# Patient Record
Sex: FEMALE | Race: WHITE | NOT HISPANIC OR LATINO | Employment: OTHER | URBAN - METROPOLITAN AREA
[De-identification: names, ages, dates, MRNs, and addresses within clinical notes are randomized per-mention and may not be internally consistent; named-entity substitution may affect disease eponyms.]

---

## 2017-11-21 ENCOUNTER — TRANSFERRED RECORDS (OUTPATIENT)
Dept: HEALTH INFORMATION MANAGEMENT | Facility: CLINIC | Age: 62
End: 2017-11-21

## 2019-02-28 ENCOUNTER — OFFICE VISIT (OUTPATIENT)
Dept: FAMILY MEDICINE | Facility: CLINIC | Age: 64
End: 2019-02-28
Payer: COMMERCIAL

## 2019-02-28 VITALS
RESPIRATION RATE: 20 BRPM | DIASTOLIC BLOOD PRESSURE: 70 MMHG | SYSTOLIC BLOOD PRESSURE: 120 MMHG | HEART RATE: 82 BPM | BODY MASS INDEX: 36.19 KG/M2 | TEMPERATURE: 97.7 F | WEIGHT: 212 LBS | HEIGHT: 64 IN | OXYGEN SATURATION: 94 %

## 2019-02-28 DIAGNOSIS — Z12.31 ENCOUNTER FOR SCREENING MAMMOGRAM FOR BREAST CANCER: ICD-10-CM

## 2019-02-28 DIAGNOSIS — Z11.59 NEED FOR HEPATITIS C SCREENING TEST: ICD-10-CM

## 2019-02-28 DIAGNOSIS — I10 ESSENTIAL HYPERTENSION: ICD-10-CM

## 2019-02-28 DIAGNOSIS — E78.5 HYPERLIPIDEMIA, UNSPECIFIED HYPERLIPIDEMIA TYPE: ICD-10-CM

## 2019-02-28 DIAGNOSIS — L30.9 ECZEMA, UNSPECIFIED TYPE: ICD-10-CM

## 2019-02-28 DIAGNOSIS — E66.01 MORBID OBESITY (H): ICD-10-CM

## 2019-02-28 DIAGNOSIS — M81.0 OSTEOPOROSIS, UNSPECIFIED OSTEOPOROSIS TYPE, UNSPECIFIED PATHOLOGICAL FRACTURE PRESENCE: ICD-10-CM

## 2019-02-28 DIAGNOSIS — Z23 NEED FOR TD VACCINE: Primary | ICD-10-CM

## 2019-02-28 DIAGNOSIS — Z00.00 ROUTINE HISTORY AND PHYSICAL EXAMINATION OF ADULT: ICD-10-CM

## 2019-02-28 LAB
ANION GAP SERPL CALCULATED.3IONS-SCNC: 8 MMOL/L (ref 3–14)
BUN SERPL-MCNC: 16 MG/DL (ref 7–30)
CALCIUM SERPL-MCNC: 8.9 MG/DL (ref 8.5–10.1)
CHLORIDE SERPL-SCNC: 106 MMOL/L (ref 94–109)
CHOLEST SERPL-MCNC: 163 MG/DL
CO2 SERPL-SCNC: 28 MMOL/L (ref 20–32)
CREAT SERPL-MCNC: 0.69 MG/DL (ref 0.52–1.04)
GFR SERPL CREATININE-BSD FRML MDRD: >90 ML/MIN/{1.73_M2}
GLUCOSE SERPL-MCNC: 96 MG/DL (ref 70–99)
HCV AB SERPL QL IA: NONREACTIVE
HDLC SERPL-MCNC: 52 MG/DL
HGB BLD-MCNC: 15.1 G/DL (ref 11.7–15.7)
LDLC SERPL CALC-MCNC: 71 MG/DL
NONHDLC SERPL-MCNC: 111 MG/DL
POTASSIUM SERPL-SCNC: 3.8 MMOL/L (ref 3.4–5.3)
SODIUM SERPL-SCNC: 142 MMOL/L (ref 133–144)
TRIGL SERPL-MCNC: 202 MG/DL

## 2019-02-28 PROCEDURE — 85018 HEMOGLOBIN: CPT | Performed by: NURSE PRACTITIONER

## 2019-02-28 PROCEDURE — 99386 PREV VISIT NEW AGE 40-64: CPT | Performed by: NURSE PRACTITIONER

## 2019-02-28 PROCEDURE — 36415 COLL VENOUS BLD VENIPUNCTURE: CPT | Performed by: NURSE PRACTITIONER

## 2019-02-28 PROCEDURE — 86803 HEPATITIS C AB TEST: CPT | Performed by: NURSE PRACTITIONER

## 2019-02-28 PROCEDURE — 80048 BASIC METABOLIC PNL TOTAL CA: CPT | Performed by: NURSE PRACTITIONER

## 2019-02-28 PROCEDURE — 80061 LIPID PANEL: CPT | Performed by: NURSE PRACTITIONER

## 2019-02-28 RX ORDER — ALENDRONATE SODIUM 70 MG/1
70 TABLET ORAL WEEKLY
Qty: 4 TABLET | Refills: 12 | Status: SHIPPED | OUTPATIENT
Start: 2019-02-28 | End: 2020-02-25

## 2019-02-28 RX ORDER — ATORVASTATIN CALCIUM 20 MG/1
1 TABLET, FILM COATED ORAL DAILY
COMMUNITY
Start: 2019-02-07 | End: 2019-02-28

## 2019-02-28 RX ORDER — TRIAMCINOLONE ACETONIDE 1 MG/G
CREAM TOPICAL 2 TIMES DAILY
Qty: 45 G | Refills: 1 | Status: SHIPPED | OUTPATIENT
Start: 2019-02-28 | End: 2020-02-25

## 2019-02-28 RX ORDER — ATORVASTATIN CALCIUM 20 MG/1
20 TABLET, FILM COATED ORAL DAILY
Qty: 30 TABLET | Refills: 11 | Status: SHIPPED | OUTPATIENT
Start: 2019-02-28 | End: 2020-02-25

## 2019-02-28 RX ORDER — ALENDRONATE SODIUM 70 MG/1
1 TABLET ORAL WEEKLY
COMMUNITY
Start: 2019-02-07 | End: 2019-02-28

## 2019-02-28 RX ORDER — HYDROCHLOROTHIAZIDE 25 MG/1
1 TABLET ORAL DAILY
COMMUNITY
Start: 2019-02-07 | End: 2019-02-28

## 2019-02-28 RX ORDER — HYDROCHLOROTHIAZIDE 25 MG/1
25 TABLET ORAL DAILY
Qty: 30 TABLET | Refills: 11 | Status: SHIPPED | OUTPATIENT
Start: 2019-02-28 | End: 2020-02-25

## 2019-02-28 RX ORDER — PHENOL 1.4 %
1 AEROSOL, SPRAY (ML) MUCOUS MEMBRANE DAILY
COMMUNITY

## 2019-02-28 SDOH — HEALTH STABILITY: MENTAL HEALTH: HOW OFTEN DO YOU HAVE A DRINK CONTAINING ALCOHOL?: NEVER

## 2019-02-28 ASSESSMENT — ENCOUNTER SYMPTOMS
BREAST MASS: 0
JOINT SWELLING: 0
CONSTIPATION: 0
CHILLS: 0
EYE PAIN: 0
COUGH: 0
FREQUENCY: 0
HEMATURIA: 0
SHORTNESS OF BREATH: 0
NERVOUS/ANXIOUS: 0
ARTHRALGIAS: 0
ABDOMINAL PAIN: 0
SORE THROAT: 0
HEMATOCHEZIA: 0
DIARRHEA: 0
FEVER: 0
DIZZINESS: 0
NAUSEA: 0
PARESTHESIAS: 1
PALPITATIONS: 0
DYSURIA: 0
HEADACHES: 0
MYALGIAS: 0
HEARTBURN: 0
WEAKNESS: 0

## 2019-02-28 ASSESSMENT — MIFFLIN-ST. JEOR: SCORE: 1488.69

## 2019-02-28 ASSESSMENT — PAIN SCALES - GENERAL: PAINLEVEL: NO PAIN (0)

## 2019-02-28 NOTE — PATIENT INSTRUCTIONS
Schedule DEXA, mammogram in June.    Consider getting the Shingrix vaccine to prevent shingles.  Please check with your insurance to see if this is best covered at the pharmacy or at a clinic visit.  You can walk into any pharmacy and get the shot without a prescription.  You may also schedule a nurse only visit to have the shot given at the clinic.

## 2019-02-28 NOTE — LETTER
[address] [place] [address]    March 4, 2019    Masha Chery  [address] [place] [id number]          Dear Masha,    [place] Hepatitis C.   Good cholesterol.   Normal kidney function and electrolytes.   No anemia.     If you have any questions please feel free to contact [phone] or myself via [employer].     Enclosed is a copy of your results.     Results for orders placed or performed in visit on 02/28/19   Lipid panel reflex to direct LDL Fasting   Result Value Ref Range    Cholesterol 163 <200 mg/dL    Triglycerides 202 (H) <150 mg/dL    HDL Cholesterol 52 >49 mg/dL    LDL Cholesterol Calculated 71 <100 mg/dL    Non HDL Cholesterol 111 <130 mg/dL   Basic metabolic panel   Result Value Ref Range    Sodium 142 133 - 144 mmol/L    Potassium 3.8 3.4 - 5.3 mmol/L    Chloride 106 94 - 109 mmol/L    Carbon Dioxide 28 20 - 32 mmol/L    Anion Gap 8 3 - 14 mmol/L    Glucose 96 70 - 99 mg/dL    Urea Nitrogen 16 7 - 30 mg/dL    Creatinine 0.69 0.52 - 1.04 mg/dL    GFR Estimate >90 >60 mL/min/[1.73_m2]    GFR Estimate If Black >90 >60 mL/min/[1.73_m2]    Calcium 8.9 8.5 - 10.1 mg/dL   Hemoglobin   Result Value Ref Range    Hemoglobin 15.1 11.7 - 15.7 g/dL   Hepatitis C antibody   Result Value Ref Range    Hepatitis C Antibody Nonreactive NR^Nonreactive       If you have any questions or concerns, please call myself or my nurse at [phone].      Sincerely,        Claudia Salinas CNP/purdy
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
## 2019-03-01 PROCEDURE — 90471 IMMUNIZATION ADMIN: CPT | Performed by: NURSE PRACTITIONER

## 2019-03-01 PROCEDURE — 90714 TD VACC NO PRESV 7 YRS+ IM: CPT | Performed by: NURSE PRACTITIONER

## 2019-03-01 NOTE — RESULT ENCOUNTER NOTE
Dear Masha,    Your recent test results are attached.      No Hepatitis C.  Good cholesterol.  Normal kidney function and electrolytes.  No anemia.    If you have any questions please feel free to contact [phone] or myself via [employer].    Sincerely,  Claudia Salinas, CNP

## 2019-07-09 ENCOUNTER — OFFICE VISIT (OUTPATIENT)
Dept: FAMILY MEDICINE | Facility: CLINIC | Age: 64
End: 2019-07-09
Payer: COMMERCIAL

## 2019-07-09 VITALS
SYSTOLIC BLOOD PRESSURE: 115 MMHG | BODY MASS INDEX: 36.44 KG/M2 | HEART RATE: 54 BPM | WEIGHT: 209 LBS | TEMPERATURE: 98.2 F | DIASTOLIC BLOOD PRESSURE: 81 MMHG | OXYGEN SATURATION: 97 %

## 2019-07-09 DIAGNOSIS — K21.00 GASTROESOPHAGEAL REFLUX DISEASE WITH ESOPHAGITIS: ICD-10-CM

## 2019-07-09 DIAGNOSIS — J30.1 SEASONAL ALLERGIC RHINITIS DUE TO POLLEN: Primary | ICD-10-CM

## 2019-07-09 PROCEDURE — 99213 OFFICE O/P EST LOW 20 MIN: CPT | Performed by: INTERNAL MEDICINE

## 2019-07-09 RX ORDER — OMEPRAZOLE 40 MG/1
40 CAPSULE, DELAYED RELEASE ORAL DAILY
Qty: 90 CAPSULE | Refills: 3 | Status: SHIPPED | OUTPATIENT
Start: 2019-07-09 | End: 2020-02-25

## 2019-07-09 RX ORDER — CETIRIZINE HYDROCHLORIDE 10 MG/1
10 TABLET ORAL DAILY
Qty: 90 TABLET | Refills: 3 | Status: SHIPPED | OUTPATIENT
Start: 2019-07-09 | End: 2020-02-25

## 2019-07-09 ASSESSMENT — PAIN SCALES - GENERAL: PAINLEVEL: NO PAIN (0)

## 2019-07-09 NOTE — PROGRESS NOTES
Subjective     Masha Chery is a 63 year old female who presents to clinic today for the following health issues:    HPI   Cough/allergies for the past couple weeks  Upset stomach for the past 3 days  None    Coughing . Allergies ( garcía macho)  Some degree in the past.  2-3 years - was on  in the past and then removed   Broke femur at that time  Take extra calcium ( back on fosamax)  No prilosec    Hits at night.     Stomach ache at that time  gallbaldder ( benadryl and nyquil)  Up with bowel ache and       Patient Active Problem List   Diagnosis     Eczematous dermatitis     Essential hypertension     Hypercalciuria, idiopathic     Hyperlipidemia     Osteoporosis     Obesity (BMI 35.0-39.9) with comorbidity (H)     Past Surgical History:   Procedure Laterality Date     CHOLECYSTECTOMY       FEMUR SURGERY Right     Following fracture, macho and screws in place     HYSTERECTOMY, PAP NO LONGER INDICATED         Social History     Tobacco Use     Smoking status: Former Smoker     Types: Cigarettes     Start date: 1973     Last attempt to quit: 1983     Years since quittin.3     Smokeless tobacco: Never Used   Substance Use Topics     Alcohol use: No     Frequency: Never     Family History   Problem Relation Age of Onset     Diabetes Mother      Cerebrovascular Disease Father      Coronary Artery Disease Brother 51     Cerebrovascular Disease Sister          Current Outpatient Medications   Medication Sig Dispense Refill     alendronate (FOSAMAX) 70 MG tablet Take 1 tablet (70 mg) by mouth once a week 4 tablet 12     aspirin (ASA) 325 MG EC tablet Take 325 mg by mouth daily       atorvastatin (LIPITOR) 20 MG tablet Take 1 tablet (20 mg) by mouth daily 30 tablet 11     B Complex Vitamins (VITAMIN B COMPLEX PO) Take 1 tablet by mouth daily       Bioflavonoid Products (VITAMIN C PLUS) 1000 MG TABS Take 1 tablet by mouth daily       calcium carbonate-vitamin D ( CALCIUM 500/VITAMIN D3) 500-400  MG-UNIT tablet Take 1 tablet by mouth daily       cetirizine (ZYRTEC) 10 MG tablet Take 1 tablet (10 mg) by mouth daily 90 tablet 3     hydrochlorothiazide (HYDRODIURIL) 25 MG tablet Take 1 tablet (25 mg) by mouth daily 30 tablet 11     MAGNESIUM PO Take 250 mg by mouth daily       MAGNESIUM PO Take 100 mg by mouth daily       Multiple Vitamins-Minerals (MULTIVITAMIN WOMEN) TABS Take 1 tablet by mouth daily       Omega-3 Fatty Acids (FISH OIL) 600 MG CAPS Take 1 capsule by mouth daily       omeprazole (PRILOSEC) 40 MG DR capsule Take 1 capsule (40 mg) by mouth daily 90 capsule 3     triamcinolone (KENALOG) 0.1 % external cream Apply topically 2 times daily 45 g 1     Allergies   Allergen Reactions     Citric Acid Hives     Foods that contain citric acid.      Renacidin Rash     SHE'S ALLERGIC TO CITRIC ACIDS AND SOUR FOODS     Recent Labs   Lab Test 02/28/19  1030   LDL 71   HDL 52   TRIG 202*   CR 0.69   GFRESTIMATED >90   GFRESTBLACK >90   POTASSIUM 3.8          Reviewed and updated as needed this visit by Provider         Review of Systems   ROS COMP: Constitutional, HEENT, cardiovascular, pulmonary, gi and gu systems are negative, except as otherwise noted.      Objective    /81 (BP Location: Left arm, Patient Position: Chair, Cuff Size: Adult Regular)   Pulse 54   Temp 98.2  F (36.8  C) (Oral)   Wt 94.8 kg (209 lb)   SpO2 97%   Breastfeeding? No   BMI 36.44 kg/m    Body mass index is 36.44 kg/m .  Physical Exam   GENERAL: healthy, alert and no distress  EYES: Eyes grossly normal to inspection, PERRL and conjunctivae and sclerae normal  HENT: ear canals and TM's normal, nose and mouth without ulcers or lesions  NECK: no adenopathy, no asymmetry, masses, or scars and thyroid normal to palpation  RESP: lungs clear to auscultation - no rales, rhonchi or wheezes  CV: regular rate and rhythm, normal S1 S2, no S3 or S4, no murmur, click or rub, no peripheral edema and peripheral pulses strong  ABDOMEN:  "soft, nontender, no hepatosplenomegaly, no masses and bowel sounds normal  MS: no gross musculoskeletal defects noted, no edema  SKIN: no suspicious lesions or rashes  NEURO: Normal strength and tone, mentation intact and speech normal  BACK: no CVA tenderness, no paralumbar tenderness  PSYCH: mentation appears normal, affect normal/bright  LYMPH: no cervical, supraclavicular, axillary, or inguinal adenopathy    Diagnostic Test Results:  Labs reviewed in Epic  Results for orders placed or performed in visit on 02/28/19   Lipid panel reflex to direct LDL Fasting   Result Value Ref Range    Cholesterol 163 <200 mg/dL    Triglycerides 202 (H) <150 mg/dL    HDL Cholesterol 52 >49 mg/dL    LDL Cholesterol Calculated 71 <100 mg/dL    Non HDL Cholesterol 111 <130 mg/dL   Basic metabolic panel   Result Value Ref Range    Sodium 142 133 - 144 mmol/L    Potassium 3.8 3.4 - 5.3 mmol/L    Chloride 106 94 - 109 mmol/L    Carbon Dioxide 28 20 - 32 mmol/L    Anion Gap 8 3 - 14 mmol/L    Glucose 96 70 - 99 mg/dL    Urea Nitrogen 16 7 - 30 mg/dL    Creatinine 0.69 0.52 - 1.04 mg/dL    GFR Estimate >90 >60 mL/min/[1.73_m2]    GFR Estimate If Black >90 >60 mL/min/[1.73_m2]    Calcium 8.9 8.5 - 10.1 mg/dL   Hemoglobin   Result Value Ref Range    Hemoglobin 15.1 11.7 - 15.7 g/dL   Hepatitis C antibody   Result Value Ref Range    Hepatitis C Antibody Nonreactive NR^Nonreactive           Assessment & Plan       ICD-10-CM    1. Seasonal allergic rhinitis due to pollen J30.1 cetirizine (ZYRTEC) 10 MG tablet   2. Gastroesophageal reflux disease with esophagitis K21.0 omeprazole (PRILOSEC) 40 MG DR capsule      work-up if not improved in 4-6 weeks    BMI:   Estimated body mass index is 36.44 kg/m  as calculated from the following:    Height as of 2/28/19: 1.613 m (5' 3.5\").    Weight as of this encounter: 94.8 kg (209 lb).   Weight management plan: Discussed healthy diet and exercise guidelines        Work on weight loss  Regular " exercise    No follow-ups on file.    Jim Santoyo MD  [facility]

## 2020-01-21 ENCOUNTER — OFFICE VISIT (OUTPATIENT)
Dept: FAMILY MEDICINE | Facility: CLINIC | Age: 65
End: 2020-01-21
Payer: COMMERCIAL

## 2020-01-21 VITALS
WEIGHT: 222 LBS | TEMPERATURE: 97.4 F | HEIGHT: 64 IN | HEART RATE: 91 BPM | DIASTOLIC BLOOD PRESSURE: 72 MMHG | RESPIRATION RATE: 13 BRPM | SYSTOLIC BLOOD PRESSURE: 110 MMHG | BODY MASS INDEX: 37.9 KG/M2

## 2020-01-21 DIAGNOSIS — L30.1 DYSHIDROTIC ECZEMA: Primary | ICD-10-CM

## 2020-01-21 PROCEDURE — 99213 OFFICE O/P EST LOW 20 MIN: CPT | Performed by: NURSE PRACTITIONER

## 2020-01-21 RX ORDER — BETAMETHASONE DIPROPIONATE 0.05 %
OINTMENT (GRAM) TOPICAL 2 TIMES DAILY
Qty: 45 G | Refills: 0 | Status: SHIPPED | OUTPATIENT
Start: 2020-01-21 | End: 2020-02-25

## 2020-01-21 ASSESSMENT — MIFFLIN-ST. JEOR: SCORE: 1529.05

## 2020-01-21 NOTE — PATIENT INSTRUCTIONS
Patient Education     Atopic Dermatitis (Adult)  Atopic dermatitis is a dry, itchy, red rash. It s also called eczema. The rash is chronic, or ongoing. It can come and go over time. The disease is often passed down in families. It causes a problem with the skin barrier that makes the skin more sensitive to the environment and other factors. The increased skin sensitivity causes an itch, which causes scratching. Scratching can worsen the itching or also break the skin. This can put the skin at risk of infection.  The condition is most common in people with asthma, hay fever, hives, or dry or sensitive skin. The rash may be caused by extreme heat or heavy sweating. Skin irritants can cause the rash to flare up. These can include wool or silk clothing, grease, oils, some medicines, and harsh soaps and detergents. Emotional stress can also be a trigger.  Treatment is done to relieve the itching and inflammation of the skin.  Home care  Follow these tips to care for your condition:    Keep the areas of rash clean by bathing at least every other day. Use lukewarm water to bathe. Don t use hot water, which can dry out the skin.    Don t use soaps with strong detergents. Use mild soaps made for sensitive skin.    Apply a cream or ointment to damp skin right after bathing.    Avoid things that irritate your skin. Wear absorbent, soft fabrics next to the skin rather than rough or scratchy materials.    Use mild laundry soap free of scents and perfumes. Make sure to rinse all the soap out of your clothes.    Treat any skin infection as directed.    Use oral diphenhydramine to help reduce itching. This is an antihistamine you can buy at drug and grocery stores. It can make you sleepy, so use lower doses during the daytime. Or you can use loratadine. This is an antihistamine that will not make you sleepy. Do not use diphenhydramine if you have glaucoma or have trouble urinating due to an enlarged prostate.  Follow-up care  See  your healthcare provider, or as advised. If your symptoms don t get better or if they get worse in the next 7 days, make an appointment with your healthcare provider.  When to seek medical advice  Call your healthcare provider right away  if any of these occur:    Increasing area of redness or pain in the skin    Yellow crusts or wet drainage from the rash    Fever of 100.4 F (38 C) or higher, or as directed by your healthcare provider  Date Last Reviewed: 9/1/2016 2000-2019 [employer]. [address] [place] [address]. All rights reserved. This information is not intended as a substitute for professional medical care. Always follow your healthcare professional's instructions.    Topical corticosteroids are applied twice daily for two to four weeks       General measures -- In clinical experience, avoidance of irritants or exacerbating factors is beneficial for most patients with dyshidrotic eczema. General skin care measures aimed at reducing skin irritation and restoring the skin barrier include [25]:  ?Using lukewarm water and soap-free cleansers to wash hands  ?Drying hands thoroughly after washing  ?Applying emollients (eg, petroleum jelly) immediately after hand drying and as often as possible   ?Wearing cotton gloves under vinyl or other nonlatex gloves when performing wet work  ?Removing rings and watches and bracelets before wet work  ?Wearing protective gloves in cold weather   ?Wearing task-specific gloves for frictional exposures (eg, gardening, carpentry)  ?Avoiding exposure to irritants (eg, detergents, solvents, hair lotions or dyes, acidic foods [eg, citrus fruit])

## 2020-01-21 NOTE — PROGRESS NOTES
"Subjective     Masha Chery is a 64 year old female who presents to clinic today for the following health issues:    HPI   Concern - hands  Onset: 1 month    Description:   Redness and painful, dry and itchy    Intensity: severe    Progression of Symptoms:  worsening    Accompanying Signs & Symptoms:  Swollen     Previous history of similar problem:   none    Precipitating factors:   Worsened by: washing of hand    Alleviating factors:  Improved by:     Therapies Tried and outcome: all topic of lotion and cream    Past Medical History:   Diagnosis Date     Hyperlipidemia      Hypertension      Osteoporosis      Current Outpatient Medications   Medication     alendronate (FOSAMAX) 70 MG tablet     aspirin (ASA) 325 MG EC tablet     atorvastatin (LIPITOR) 20 MG tablet     B Complex Vitamins (VITAMIN B COMPLEX PO)     betamethasone dipropionate (DIPROSONE) 0.05 % external ointment     Bioflavonoid Products (VITAMIN C PLUS) 1000 MG TABS     calcium carbonate-vitamin D (SM CALCIUM 500/VITAMIN D3) 500-400 MG-UNIT tablet     cetirizine (ZYRTEC) 10 MG tablet     hydrochlorothiazide (HYDRODIURIL) 25 MG tablet     MAGNESIUM PO     MAGNESIUM PO     Multiple Vitamins-Minerals (MULTIVITAMIN WOMEN) TABS     Omega-3 Fatty Acids (FISH OIL) 600 MG CAPS     omeprazole (PRILOSEC) 40 MG DR capsule     triamcinolone (KENALOG) 0.1 % external cream     No current facility-administered medications for this visit.         Allergies   Allergen Reactions     Citric Acid Hives     Foods that contain citric acid.      Renacidin Rash     SHE'S ALLERGIC TO CITRIC ACIDS AND SOUR FOODS       Review of Systems   ROS COMP: Constitutional, HEENT, cardiovascular, pulmonary, GI, , musculoskeletal, neuro, skin, endocrine and psych systems are negative, except as otherwise noted.      Objective    /72   Pulse 91   Temp 97.4  F (36.3  C)   Resp 13   Ht 1.613 m (5' 3.5\")   Wt 100.7 kg (222 lb)   BMI 38.71 kg/m      Physical Exam "   GENERAL: Alert and no distress  EYES: Eyes grossly normal to inspection, PERRL and conjunctivae and sclerae normal  HENT: ear canals and TM's normal, nose and mouth without ulcers or lesions  NECK: no adenopathy, no asymmetry, masses, or scars and thyroid normal to palpation  RESP: lungs clear to auscultation - no rales, rhonchi or wheezes  CV: regular rate and rhythm, normal S1 S2, no S3 or S4, no murmur, click or rub, no peripheral edema and peripheral pulses strong  SKIN: pruritic, vesicular eruption affecting both hands    Diagnostic Test Results:  Labs reviewed in Epic        Assessment & Plan     1. Dyshidrotic eczema    - betamethasone dipropionate (DIPROSONE) 0.05 % external ointment; Apply topically 2 times daily for 14 days  Dispense: 45 g; Refill: 0    General measures -- In clinical experience, avoidance of irritants or exacerbating factors is beneficial for most patients with dyshidrotic eczema. General skin care measures aimed at reducing skin irritation and restoring the skin barrier include [25]:  ?Using lukewarm water and soap-free cleansers to wash hands  ?Drying hands thoroughly after washing  ?Applying emollients (eg, petroleum jelly) immediately after hand drying and as often as possible   ?Wearing cotton gloves under vinyl or other nonlatex gloves when performing wet work  ?Removing rings and watches and bracelets before wet work  ?Wearing protective gloves in cold weather   ?Wearing task-specific gloves for frictional exposures (eg, gardening, carpentry)  ?Avoiding exposure to irritants (eg, detergents, solvents, hair lotions or dyes, acidic foods [eg, citrus fruit])     Call or rtc if new worsening     PURA Mccall [facility]

## 2020-02-13 ENCOUNTER — TRANSFERRED RECORDS (OUTPATIENT)
Dept: HEALTH INFORMATION MANAGEMENT | Facility: CLINIC | Age: 65
End: 2020-02-13

## 2020-02-17 ENCOUNTER — ANCILLARY PROCEDURE (OUTPATIENT)
Dept: GENERAL RADIOLOGY | Facility: CLINIC | Age: 65
End: 2020-02-17
Attending: FAMILY MEDICINE
Payer: COMMERCIAL

## 2020-02-17 ENCOUNTER — OFFICE VISIT (OUTPATIENT)
Dept: FAMILY MEDICINE | Facility: CLINIC | Age: 65
End: 2020-02-17
Payer: COMMERCIAL

## 2020-02-17 VITALS
DIASTOLIC BLOOD PRESSURE: 80 MMHG | HEIGHT: 64 IN | HEART RATE: 65 BPM | WEIGHT: 222 LBS | TEMPERATURE: 97.8 F | SYSTOLIC BLOOD PRESSURE: 119 MMHG | OXYGEN SATURATION: 95 % | BODY MASS INDEX: 37.9 KG/M2

## 2020-02-17 DIAGNOSIS — R07.81 RIB PAIN ON RIGHT SIDE: ICD-10-CM

## 2020-02-17 DIAGNOSIS — R07.81 RIB PAIN ON RIGHT SIDE: Primary | ICD-10-CM

## 2020-02-17 PROCEDURE — 71101 X-RAY EXAM UNILAT RIBS/CHEST: CPT | Mod: RT

## 2020-02-17 PROCEDURE — 99213 OFFICE O/P EST LOW 20 MIN: CPT | Performed by: FAMILY MEDICINE

## 2020-02-17 RX ORDER — ALENDRONATE SODIUM 70 MG/1
70 TABLET ORAL WEEKLY
Qty: 4 TABLET | Refills: 12 | Status: CANCELLED | OUTPATIENT
Start: 2020-02-17

## 2020-02-17 RX ORDER — CETIRIZINE HYDROCHLORIDE 10 MG/1
10 TABLET ORAL DAILY
Qty: 90 TABLET | Refills: 3 | Status: CANCELLED | OUTPATIENT
Start: 2020-02-17

## 2020-02-17 RX ORDER — CYCLOBENZAPRINE HCL 5 MG
5 TABLET ORAL
Qty: 30 TABLET | Refills: 0 | Status: SHIPPED | OUTPATIENT
Start: 2020-02-17 | End: 2020-02-25

## 2020-02-17 ASSESSMENT — MIFFLIN-ST. JEOR: SCORE: 1536.99

## 2020-02-17 ASSESSMENT — PAIN SCALES - GENERAL: PAINLEVEL: EXTREME PAIN (8)

## 2020-02-17 NOTE — PATIENT INSTRUCTIONS
Patient Education     Rib Contusion or Minor Fracture    A rib contusion is a bruise to one or more rib bones. It may cause pain, tenderness, swelling, and a purplish color to the skin. There may be a sharp pain with each breath. A rib contusion takes anywhere from a few days to a few weeks to heal. A minor rib fracture or break may cause the same symptoms as a rib contusion. The small crack may not be seen on a regular chest X-ray. Treatment for both problems is basically the same.  Home care    You may use over-the-counter pain medicine to control pain, unless another pain medicine was prescribed. If you have chronic liver or kidney disease or ever had a stomach ulcer or GI (gastrointestinal) bleeding, talk with your healthcare provider before using these medicines.    Rest. Don't lift anything heavy or do any activity that causes pain.    Apply an ice pack over the injured area for 15 to 20 minutes every 1 to 2 hours. You should do this for the first 24 to 48 hours. To make an ice pack, put ice cubes in a plastic bag that seals at the top. Wrap the bag in a clean, thin towel or cloth. Never put ice or an ice pack directly on the skin. Continue with ice packs as needed for the relief of pain and swelling.    The first 3 to 4 weeks of healing will be the most painful. If your pain is not under control with the treatment given, call your healthcare provider. Sometimes a stronger pain medicine may be needed. A nerve block can be done in case of severe pain. It will numb the nerve between the ribs.  Follow-up care  Follow up with your healthcare provider, or as advised.  If X-rays were taken, you will be told of any new findings that may affect your care.  Call 911  Call 911 if you have:    Dizziness, weakness or fainting    Shortness of breath with or without chest discomfort    New or worsening pain  When to seek medical advice  Call your healthcare provider right away if any of these occur:    Fever of 100.4 F  (38 C) or higher, or as directed by your healthcare provider    Chills    Stomach pain, vomiting  Date Last Reviewed: 6/1/2018 2000-2019 [employer]. [address] [place] [address], [place] [address]. All rights reserved. This information is not intended as a substitute for professional medical care. Always follow your healthcare professional's instructions.

## 2020-02-17 NOTE — PROGRESS NOTES
Subjective     Masha Chery is a 64 year old female who presents to clinic today for the following health issues:    HPI :  Patient reports 2 weeks ago she was sitting in her chair when she leaned to her right side she bruised right side of her upper back rib area.  Since that time is been painful especially when she moves certain ways.  Been taking Tylenol and that seems to help.    She has no fever, no cough, no wheezing.  Denies falling.  No bruising or rashes    FLANK   PAIN     Onset: two weeks    Description:   Character: Sharp  Location: right upper quadrant  Radiation: right side of chest    Intensity: severe, 8/10    Progression of Symptoms:  worsening and constant    Accompanying Signs & Symptoms:  Fever/Chills?: no   Gas/Bloating: no   Nausea: no   Vomitting: no   Diarrhea?: no   Constipation:no   Dysuria or Hematuria: no    History:   Trauma: YES-  Stretched to far  Previous similar pain: no    Previous tests done: none    Precipitating factors:   Does the pain change with:     Food: no      BM: no     Urination: no     Alleviating factors:  None    Therapies Tried and outcome: Arthritis pills    LMP:  not applicable         Patient Active Problem List   Diagnosis     Eczematous dermatitis     Essential hypertension     Hypercalciuria, idiopathic     Hyperlipidemia     Osteoporosis     Obesity (BMI 35.0-39.9) with comorbidity (H)     Past Surgical History:   Procedure Laterality Date     CHOLECYSTECTOMY       FEMUR SURGERY Right     Following fracture, macho and screws in place     HYSTERECTOMY, PAP NO LONGER INDICATED         Social History     Tobacco Use     Smoking status: Former Smoker     Types: Cigarettes     Start date: 1973     Last attempt to quit: 1983     Years since quittin.9     Smokeless tobacco: Never Used   Substance Use Topics     Alcohol use: No     Frequency: Never     Family History   Problem Relation Age of Onset     Diabetes Mother      Cerebrovascular  Disease Father      Coronary Artery Disease Brother 51     Cerebrovascular Disease Sister          Current Outpatient Medications   Medication Sig Dispense Refill     alendronate (FOSAMAX) 70 MG tablet Take 1 tablet (70 mg) by mouth once a week 4 tablet 12     aspirin (ASA) 325 MG EC tablet Take 325 mg by mouth daily       atorvastatin (LIPITOR) 20 MG tablet Take 1 tablet (20 mg) by mouth daily 30 tablet 11     B Complex Vitamins (VITAMIN B COMPLEX PO) Take 1 tablet by mouth daily       Bioflavonoid Products (VITAMIN C PLUS) 1000 MG TABS Take 1 tablet by mouth daily       calcium carbonate-vitamin D (SM CALCIUM 500/VITAMIN D3) 500-400 MG-UNIT tablet Take 1 tablet by mouth daily       cetirizine (ZYRTEC) 10 MG tablet Take 1 tablet (10 mg) by mouth daily 90 tablet 3     cyclobenzaprine (FLEXERIL) 5 MG tablet Take 1 tablet (5 mg) by mouth nightly as needed for muscle spasms 30 tablet 0     hydrochlorothiazide (HYDRODIURIL) 25 MG tablet Take 1 tablet (25 mg) by mouth daily 30 tablet 11     MAGNESIUM PO Take 250 mg by mouth daily       Multiple Vitamins-Minerals (MULTIVITAMIN WOMEN) TABS Take 1 tablet by mouth daily       Omega-3 Fatty Acids (FISH OIL) 600 MG CAPS Take 1 capsule by mouth daily       omeprazole (PRILOSEC) 40 MG DR capsule Take 1 capsule (40 mg) by mouth daily 90 capsule 3     triamcinolone (KENALOG) 0.1 % external cream Apply topically 2 times daily 45 g 1     MAGNESIUM PO Take 100 mg by mouth daily       Allergies   Allergen Reactions     Citric Acid Hives     Foods that contain citric acid.      Renacidin Rash     SHE'S ALLERGIC TO CITRIC ACIDS AND SOUR FOODS       Reviewed and updated as needed this visit by Provider         Review of Systems   ROS COMP: Constitutional, HEENT, cardiovascular, pulmonary, gi and gu systems are negative, except as otherwise noted.      Objective    There were no vitals taken for this visit.  There is no height or weight on file to calculate BMI.  Physical Exam   GENERAL:  "healthy, alert and no distress  RESP: lungs clear to auscultation - no rales, rhonchi or wheezes  CV: regular rate and rhythm, normal S1 S2, no S3 or S4, no murmur, click or rub, no peripheral edema and peripheral pulses strong  MS: right upper/ back ribs area tender    Diagnostic Test Results:  Labs reviewed in Epic  CXR, with ribs: negative    Assessment & Plan       ICD-10-CM    1. Rib pain on right side R07.81 XR Ribs & Chest Right G/E 3 Views     cyclobenzaprine (FLEXERIL) 5 MG tablet     Negative for x-rays, advised patient with supportive care, continue applying ice to that area.  Continue with Tylenol 3 times daily as needed.  Take Flexeril at bedtime as needed.    In addition, continue taking deep breaths.  May apply topical lidocaine cream or BenGay cream.    Patient does have a follow-up appointment next week for for a full physical exam  BMI:   Estimated body mass index is 38.11 kg/m  as calculated from the following:    Height as of this encounter: 1.626 m (5' 4\").    Weight as of this encounter: 100.7 kg (222 lb).   Weight management plan: Discussed healthy diet and exercise guidelines        Patient Instructions     Patient Education     Rib Contusion or Minor Fracture    A rib contusion is a bruise to one or more rib bones. It may cause pain, tenderness, swelling, and a purplish color to the skin. There may be a sharp pain with each breath. A rib contusion takes anywhere from a few days to a few weeks to heal. A minor rib fracture or break may cause the same symptoms as a rib contusion. The small crack may not be seen on a regular chest X-ray. Treatment for both problems is basically the same.  Home care    You may use over-the-counter pain medicine to control pain, unless another pain medicine was prescribed. If you have chronic liver or kidney disease or ever had a stomach ulcer or GI (gastrointestinal) bleeding, talk with your healthcare provider before using these medicines.    Rest. Don't lift " anything heavy or do any activity that causes pain.    Apply an ice pack over the injured area for 15 to 20 minutes every 1 to 2 hours. You should do this for the first 24 to 48 hours. To make an ice pack, put ice cubes in a plastic bag that seals at the top. Wrap the bag in a clean, thin towel or cloth. Never put ice or an ice pack directly on the skin. Continue with ice packs as needed for the relief of pain and swelling.    The first 3 to 4 weeks of healing will be the most painful. If your pain is not under control with the treatment given, call your healthcare provider. Sometimes a stronger pain medicine may be needed. A nerve block can be done in case of severe pain. It will numb the nerve between the ribs.  Follow-up care  Follow up with your healthcare provider, or as advised.  If X-rays were taken, you will be told of any new findings that may affect your care.  Call 911  Call 911 if you have:    Dizziness, weakness or fainting    Shortness of breath with or without chest discomfort    New or worsening pain  When to seek medical advice  Call your healthcare provider right away if any of these occur:    Fever of 100.4 F (38 C) or higher, or as directed by your healthcare provider    Chills    Stomach pain, vomiting  Date Last Reviewed: 6/1/2018 2000-2019 [employer]. [address] [place] [address], [place] [address]. All rights reserved. This information is not intended as a substitute for professional medical care. Always follow your healthcare professional's instructions.               Return in about 1 week (around 2/24/2020) for Physical Exam.    Crissy Edwards MD  [facility]

## 2020-02-25 ENCOUNTER — OFFICE VISIT (OUTPATIENT)
Dept: FAMILY MEDICINE | Facility: CLINIC | Age: 65
End: 2020-02-25
Payer: COMMERCIAL

## 2020-02-25 ENCOUNTER — DOCUMENTATION ONLY (OUTPATIENT)
Dept: LAB | Facility: CLINIC | Age: 65
End: 2020-02-25

## 2020-02-25 VITALS
DIASTOLIC BLOOD PRESSURE: 78 MMHG | BODY MASS INDEX: 38.79 KG/M2 | SYSTOLIC BLOOD PRESSURE: 110 MMHG | OXYGEN SATURATION: 94 % | HEART RATE: 77 BPM | WEIGHT: 226 LBS

## 2020-02-25 DIAGNOSIS — R07.81 RIB PAIN ON RIGHT SIDE: ICD-10-CM

## 2020-02-25 DIAGNOSIS — Z00.00 ENCOUNTER FOR MEDICARE ANNUAL WELLNESS EXAM: Primary | ICD-10-CM

## 2020-02-25 DIAGNOSIS — E78.5 HYPERLIPIDEMIA LDL GOAL <100: ICD-10-CM

## 2020-02-25 DIAGNOSIS — I10 ESSENTIAL HYPERTENSION: ICD-10-CM

## 2020-02-25 DIAGNOSIS — M80.00XA AGE-RELATED OSTEOPOROSIS WITH CURRENT PATHOLOGICAL FRACTURE, INITIAL ENCOUNTER: ICD-10-CM

## 2020-02-25 DIAGNOSIS — J30.1 SEASONAL ALLERGIC RHINITIS DUE TO POLLEN: ICD-10-CM

## 2020-02-25 DIAGNOSIS — E78.5 HYPERLIPIDEMIA, UNSPECIFIED HYPERLIPIDEMIA TYPE: ICD-10-CM

## 2020-02-25 DIAGNOSIS — M81.0 OSTEOPOROSIS, UNSPECIFIED OSTEOPOROSIS TYPE, UNSPECIFIED PATHOLOGICAL FRACTURE PRESENCE: ICD-10-CM

## 2020-02-25 DIAGNOSIS — Z12.31 VISIT FOR SCREENING MAMMOGRAM: ICD-10-CM

## 2020-02-25 DIAGNOSIS — K21.00 GASTROESOPHAGEAL REFLUX DISEASE WITH ESOPHAGITIS: ICD-10-CM

## 2020-02-25 DIAGNOSIS — L30.9 ECZEMA, UNSPECIFIED TYPE: ICD-10-CM

## 2020-02-25 PROCEDURE — G0009 ADMIN PNEUMOCOCCAL VACCINE: HCPCS | Performed by: INTERNAL MEDICINE

## 2020-02-25 PROCEDURE — 90732 PPSV23 VACC 2 YRS+ SUBQ/IM: CPT | Performed by: INTERNAL MEDICINE

## 2020-02-25 PROCEDURE — 99397 PER PM REEVAL EST PAT 65+ YR: CPT | Mod: 25 | Performed by: INTERNAL MEDICINE

## 2020-02-25 RX ORDER — OMEPRAZOLE 40 MG/1
40 CAPSULE, DELAYED RELEASE ORAL DAILY
Qty: 90 CAPSULE | Refills: 3 | Status: SHIPPED | OUTPATIENT
Start: 2020-02-25 | End: 2021-01-26

## 2020-02-25 RX ORDER — ATORVASTATIN CALCIUM 20 MG/1
20 TABLET, FILM COATED ORAL DAILY
Qty: 90 TABLET | Refills: 3 | Status: SHIPPED | OUTPATIENT
Start: 2020-02-25 | End: 2020-12-26

## 2020-02-25 RX ORDER — TRIAMCINOLONE ACETONIDE 1 MG/G
CREAM TOPICAL 2 TIMES DAILY
Qty: 45 G | Refills: 1 | Status: SHIPPED | OUTPATIENT
Start: 2020-02-25 | End: 2020-08-24

## 2020-02-25 RX ORDER — HYDROCHLOROTHIAZIDE 25 MG/1
25 TABLET ORAL DAILY
Qty: 90 TABLET | Refills: 3 | Status: SHIPPED | OUTPATIENT
Start: 2020-02-25 | End: 2020-12-26

## 2020-02-25 RX ORDER — ALENDRONATE SODIUM 70 MG/1
70 TABLET ORAL WEEKLY
Qty: 12 TABLET | Refills: 3 | Status: SHIPPED | OUTPATIENT
Start: 2020-02-25 | End: 2020-12-20

## 2020-02-25 RX ORDER — CYCLOBENZAPRINE HCL 5 MG
5 TABLET ORAL
Qty: 30 TABLET | Refills: 11 | Status: SHIPPED | OUTPATIENT
Start: 2020-02-25 | End: 2023-02-13

## 2020-02-25 RX ORDER — CETIRIZINE HYDROCHLORIDE 10 MG/1
10 TABLET ORAL DAILY
Qty: 90 TABLET | Refills: 3 | Status: SHIPPED | OUTPATIENT
Start: 2020-02-25 | End: 2020-07-09

## 2020-02-25 ASSESSMENT — ENCOUNTER SYMPTOMS
WEAKNESS: 0
MYALGIAS: 1
DIZZINESS: 0
HEADACHES: 0
DYSURIA: 0
HEMATURIA: 0
PARESTHESIAS: 1
ARTHRALGIAS: 0
BREAST MASS: 0
HEMATOCHEZIA: 0
FREQUENCY: 0
SHORTNESS OF BREATH: 0
COUGH: 0
HEARTBURN: 0
CONSTIPATION: 0
NERVOUS/ANXIOUS: 0
DIARRHEA: 0
ABDOMINAL PAIN: 0
PALPITATIONS: 0
EYE PAIN: 0
FEVER: 0
NAUSEA: 0
SORE THROAT: 0
CHILLS: 0
JOINT SWELLING: 0

## 2020-02-25 ASSESSMENT — ACTIVITIES OF DAILY LIVING (ADL): CURRENT_FUNCTION: NO ASSISTANCE NEEDED

## 2020-02-25 NOTE — PROGRESS NOTES
"SUBJECTIVE:   Masha Chery is a 64 year old female who presents for Preventive Visit.  Are you in the first 12 months of your Medicare coverage?  Yes  [facility] advantage plan  Refill scripts.      Healthy Habits:     In general, how would you rate your overall health?  Good    Frequency of exercise:  None    Do you usually eat at least 4 servings of fruit and vegetables a day, include whole grains    & fiber and avoid regularly eating high fat or \"junk\" foods?  Yes    Taking medications regularly:  Yes    Barriers to taking medications:  None    Medication side effects:  None    Ability to successfully perform activities of daily living:  No assistance needed    Home Safety:  No safety concerns identified    Hearing Impairment:  No hearing concerns    In the past 6 months, have you been bothered by leaking of urine?  No    In general, how would you rate your overall mental or emotional health?  Good      PHQ-2 Total Score: 0    Additional concerns today:  No  initially off fosamax and on the calcium  Initially moved to [facility].  nd now  to [place]  Back on the fosamax.  DEXA   Was on for a few years, then off and then on 5 years.  prolia shot in the past.  2015 , but has had previous osteoporosis     Mammogram every year   Children   Do you feel safe in your environment? NOT APPLICABLE    Have you ever done Advance Care Planning? (For example, a Health Directive, POLST, or a discussion with a medical provider or your loved ones about your wishes): No, advance care planning information given to patient to review.  Patient plans to discuss their wishes with loved ones or provider.      Fall risk  Fallen 2 or more times in the past year?: No  Any fall with injury in the past year?: No    Cognitive Screening   1) Repeat 3 items (Leader, Season, Table)    2) Clock draw: NORMAL  3) 3 item recall: Recalls 3 objects  Results: 3 items recalled: COGNITIVE IMPAIRMENT LESS LIKELY    Mini-CogTM " Copyright LAURI Esquivel. Licensed by the author for use in [facility]; reprinted with permission (blair@[facility]). All rights reserved.      Do you have sleep apnea, excessive snoring or daytime drowsiness?: no    Reviewed and updated as needed this visit by clinical staff  Tobacco  Allergies  Meds  Med Hx  Surg Hx  Fam Hx  Soc Hx        Reviewed and updated as needed this visit by Provider        Social History     Tobacco Use     Smoking status: Former Smoker     Types: Cigarettes     Start date: 1973     Last attempt to quit: 1983     Years since quittin.0     Smokeless tobacco: Never Used   Substance Use Topics     Alcohol use: No     Frequency: Never     If you drink alcohol do you typically have >3 drinks per day or >7 drinks per week? No    Alcohol Use 2020   Prescreen: >3 drinks/day or >7 drinks/week? Not Applicable   Prescreen: >3 drinks/day or >7 drinks/week? -   No flowsheet data found.        Current providers sharing in care for this patient include:   Patient Care Team:  No Ref-Primary, Physician as PCP - Claudia Ross APRN CNP as Assigned PCP    The following health maintenance items are reviewed in Epic and correct as of today:  Health Maintenance   Topic Date Due     DEXA  1955     HIV SCREENING  1969     MAMMO SCREENING  2019     FALL RISK ASSESSMENT  2019     MEDICARE ANNUAL WELLNESS VISIT  2020     PNEUMOCOCCAL IMMUNIZATION 65+ LOW/MEDIUM RISK (2 of 2 - PCV13) 2021     COLONOSCOPY  2025     ADVANCE CARE PLANNING  2025     DTAP/TDAP/TD IMMUNIZATION (4 - Td) 2029     HEPATITIS C SCREENING  Completed     PHQ-2  Completed     INFLUENZA VACCINE  Completed     ZOSTER IMMUNIZATION  Completed     IPV IMMUNIZATION  Aged Out     MENINGITIS IMMUNIZATION  Aged Out     Lab work is in process  Labs reviewed in EPIC  BP Readings from Last 3 Encounters:   20 110/78   20 119/80   20 110/72     Wt Readings from Last 3 Encounters:   20 102.5 kg (226 lb)   20 100.7 kg (222 lb)   20 100.7 kg (222 lb)                  Patient Active Problem List   Diagnosis     Eczematous dermatitis     Essential hypertension     Hypercalciuria, idiopathic     Hyperlipidemia     Osteoporosis     Obesity (BMI 35.0-39.9) with comorbidity (H)     Past Surgical History:   Procedure Laterality Date     CHOLECYSTECTOMY       FEMUR SURGERY Right     Following fracture, macho and screws in place     HYSTERECTOMY, PAP NO LONGER INDICATED         Social History     Tobacco Use     Smoking status: Former Smoker     Types: Cigarettes     Start date: 1973     Last attempt to quit: 1983     Years since quittin.0     Smokeless tobacco: Never Used   Substance Use Topics     Alcohol use: No     Frequency: Never     Family History   Problem Relation Age of Onset     Diabetes Mother      Cerebrovascular Disease Father      Coronary Artery Disease Brother 51     Cerebrovascular Disease Sister          Current Outpatient Medications   Medication Sig Dispense Refill     alendronate (FOSAMAX) 70 MG tablet Take 1 tablet (70 mg) by mouth once a week 12 tablet 3     aspirin (ASA) 325 MG EC tablet Take 325 mg by mouth daily       atorvastatin (LIPITOR) 20 MG tablet Take 1 tablet (20 mg) by mouth daily 90 tablet 3     B Complex Vitamins (VITAMIN B COMPLEX PO) Take 1 tablet by mouth daily       Bioflavonoid Products (VITAMIN C PLUS) 1000 MG TABS Take 1 tablet by mouth daily       calcium carbonate-vitamin D (SM CALCIUM 500/VITAMIN D3) 500-400 MG-UNIT tablet Take 1 tablet by mouth daily       cetirizine (ZYRTEC) 10 MG tablet Take 1 tablet (10 mg) by mouth daily 90 tablet 3     cyclobenzaprine (FLEXERIL) 5 MG tablet Take 1 tablet (5 mg) by mouth nightly as needed for muscle spasms 30 tablet 11     hydrochlorothiazide (HYDRODIURIL) 25 MG tablet Take 1 tablet (25 mg) by mouth daily 90 tablet 3     MAGNESIUM PO Take 250 mg  "by mouth daily       Multiple Vitamins-Minerals (MULTIVITAMIN WOMEN) TABS Take 1 tablet by mouth daily       Omega-3 Fatty Acids (FISH OIL) 600 MG CAPS Take 1 capsule by mouth daily       omeprazole (PRILOSEC) 40 MG DR capsule Take 1 capsule (40 mg) by mouth daily 90 capsule 3     triamcinolone (KENALOG) 0.1 % external cream Apply topically 2 times daily 45 g 1     Allergies   Allergen Reactions     Citric Acid Hives     Foods that contain citric acid.      Renacidin Rash     SHE'S ALLERGIC TO CITRIC ACIDS AND SOUR FOODS         Review of Systems  Constitutional, HEENT, cardiovascular, pulmonary, gi and gu systems are negative, except as otherwise noted.    OBJECTIVE:   /78 (BP Location: Right arm, Patient Position: Chair, Cuff Size: Adult Regular)   Pulse 77   Wt 102.5 kg (226 lb)   SpO2 94%   BMI 38.79 kg/m   Estimated body mass index is 38.79 kg/m  as calculated from the following:    Height as of 2/17/20: 1.626 m (5' 4\").    Weight as of this encounter: 102.5 kg (226 lb).  Physical Exam  GENERAL: healthy, alert and no distress  EYES: Eyes grossly normal to inspection, PERRL and conjunctivae and sclerae normal  HENT: ear canals and TM's normal, nose and mouth without ulcers or lesions  NECK: no adenopathy, no asymmetry, masses, or scars and thyroid normal to palpation  RESP: lungs clear to auscultation - no rales, rhonchi or wheezes  CV: regular rate and rhythm, normal S1 S2, no S3 or S4, no murmur, click or rub, no peripheral edema and peripheral pulses strong  ABDOMEN: soft, nontender, no hepatosplenomegaly, no masses and bowel sounds normal  MS: no gross musculoskeletal defects noted, no edema  SKIN: no suspicious lesions or rashes  NEURO: Normal strength and tone, mentation intact and speech normal  BACK: no CVA tenderness, no paralumbar tenderness  PSYCH: mentation appears normal, affect normal/bright  LYMPH: no cervical, supraclavicular, axillary, or inguinal adenopathy    Diagnostic Test " "Results:  Labs reviewed in Epic  No results found for any visits on 02/25/20.    ASSESSMENT / PLAN:       ICD-10-CM    1. Encounter for Medicare annual wellness exam Z00.00    2. Osteoporosis, unspecified osteoporosis type, unspecified pathological fracture presence M81.0 alendronate (FOSAMAX) 70 MG tablet     **Vitamin D Deficiency FUTURE 2mo     **Parathyroid Hormone Intact FUTURE 2mo     Phosphorus     Magnesium     DX Hip/Pelvis/Spine   3. Hyperlipidemia, unspecified hyperlipidemia type E78.5 atorvastatin (LIPITOR) 20 MG tablet   4. Seasonal allergic rhinitis due to pollen J30.1 cetirizine (ZYRTEC) 10 MG tablet   5. Rib pain on right side R07.81 cyclobenzaprine (FLEXERIL) 5 MG tablet   6. Essential hypertension I10 hydrochlorothiazide (HYDRODIURIL) 25 MG tablet     **Basic metabolic panel FUTURE anytime   7. Gastroesophageal reflux disease with esophagitis K21.0 omeprazole (PRILOSEC) 40 MG DR capsule   8. Eczema, unspecified type L30.9 triamcinolone (KENALOG) 0.1 % external cream   9. Age-related osteoporosis with current pathological fracture, initial encounter M80.00XA ENDOCRINOLOGY ADULT REFERRAL   10. Hyperlipidemia LDL goal <100 E78.5 Lipid panel reflex to direct LDL Fasting   11. Visit for screening mammogram Z12.31 *MA Screening Digital Bilateral       COUNSELING:  Reviewed preventive health counseling, as reflected in patient instructions       Regular exercise       Healthy diet/nutrition       Vision screening       Hearing screening       Dental care       Bladder control       Fall risk prevention    Estimated body mass index is 38.79 kg/m  as calculated from the following:    Height as of 2/17/20: 1.626 m (5' 4\").    Weight as of this encounter: 102.5 kg (226 lb).    Weight management plan: Discussed healthy diet and exercise guidelines     reports that she quit smoking about 37 years ago. Her smoking use included cigarettes. She started smoking about 47 years ago. She has never used smokeless " tobacco.      Appropriate preventive services were discussed with this patient, including applicable screening as appropriate for cardiovascular disease, diabetes, osteopenia/osteoporosis, and glaucoma.  As appropriate for age/gender, discussed screening for colorectal cancer, prostate cancer, breast cancer, and cervical cancer. Checklist reviewing preventive services available has been given to the patient.    Reviewed patients plan of care and provided an AVS. The Basic Care Plan (routine screening as documented in Health Maintenance) for Masha meets the Care Plan requirement. This Care Plan has been established and reviewed with the Patient.    Counseling Resources:  ATP IV Guidelines  Pooled Cohorts Equation Calculator  Breast Cancer Risk Calculator  FRAX Risk Assessment  ICSI Preventive Guidelines  Dietary Guidelines for Americans, 2010  Prosetta's MyPlate  ASA Prophylaxis  Lung CA Screening    Jim Santoyo MD  [facility]    Identified Health Risks:    She is at risk for lack of exercise and has been provided with information to increase physical activity for the benefit of her well-being.  She is at risk for lack of exercise and has been provided with information to increase physical activity for the benefit of her well-being.

## 2020-02-25 NOTE — PATIENT INSTRUCTIONS
Patient Education   Personalized Prevention Plan  You are due for the preventive services outlined below.  Your care team is available to assist you in scheduling these services.  If you have already completed any of these items, please share that information with your care team to update in your medical record.  Health Maintenance Due   Topic Date Due     Osteoporosis Screening  1955     HIV Screening  12/24/1969     Mammogram  11/21/2019     FALL RISK ASSESSMENT  12/24/2019     PHQ-2  01/01/2020     Pneumococcal Vaccine (1 of 2 - PCV13) 12/24/2019     Annual Wellness Visit  02/28/2020       Exercise for a Healthier Heart     Exercise with a friend. When activity is fun, you're more likely to stick with it.   You may wonder how you can improve the health of your heart. If you re thinking about exercise, you re on the right track. You don t need to become an athlete, but you do need a certain amount of brisk exercise to help strengthen your heart. If you have been diagnosed with a heart condition, your doctor may recommend exercise to help stabilize your condition. To help make exercise a habit, choose safe, fun activities.  Be sure to check with your healthcare provider before starting an exercise program.  Why exercise?  Exercising regularly offers many healthy rewards. It can help you do all of the following:    Improve your blood cholesterol level to help prevent further heart trouble    Lower your blood pressure to help prevent a stroke or heart attack    Control diabetes, or reduce your risk of getting this disease    Improve your heart and lung function    Reach and maintain a healthy weight    Make your muscles stronger and more limber so you can stay active    Prevent falls and fractures by slowing the loss of bone mass (osteoporosis)    Manage stress better    Reduce your blood pressure    Improve your sense of self and your body image  Exercise tips  Ease into your routine. Set small goals. Then  build on them.  Exercise on most days. Aim for a total of 150 or more minutes of moderate to  vigorous intensity activity each week. Consider 40 minutes, 3 to 4 times a week. For best results, activity should last for 40 minutes on average. It is OK to work up to the 40 minute period over time. Examples of moderate-intensity activity is walking 1 mile in 15 minutes or 30 to 45 minutes of yard work.  Step up your daily activity level. Along with your exercise program, try being more active throughout the day. Walk instead of drive. Do more household tasks or yard work.  Choose one or more activities you enjoy. Walking is one of the easiest things you can do. You can also try swimming, riding a bike, dancing, or taking an exercise class.  Stop exercising and call your doctor if you:    Have chest pain or feel dizzy or lightheaded    Feel burning, tightness, pressure, or heaviness in your chest, neck, shoulders, back, or arms    Have unusual shortness of breath    Have increased joint or muscle pain    Have palpitations or an irregular heartbeat  Date Last Reviewed: 5/1/2016 2000-2019 [employer]. [address] [place] [address]. All rights reserved. This information is not intended as a substitute for professional medical care. Always follow your healthcare professional's instructions.           Patient Education   Personalized Prevention Plan  You are due for the preventive services outlined below.  Your care team is available to assist you in scheduling these services.  If you have already completed any of these items, please share that information with your care team to update in your medical record.  Health Maintenance Due   Topic Date Due     Osteoporosis Screening  1955     HIV Screening  12/24/1969     Mammogram  11/21/2019     FALL RISK ASSESSMENT  12/24/2019     PHQ-2  01/01/2020     Pneumococcal Vaccine (1 of 2 - PCV13) 12/24/2019     Annual Wellness Visit  02/28/2020        Exercise for a Healthier Heart     Exercise with a friend. When activity is fun, you're more likely to stick with it.   You may wonder how you can improve the health of your heart. If you re thinking about exercise, you re on the right track. You don t need to become an athlete, but you do need a certain amount of brisk exercise to help strengthen your heart. If you have been diagnosed with a heart condition, your doctor may recommend exercise to help stabilize your condition. To help make exercise a habit, choose safe, fun activities.  Be sure to check with your healthcare provider before starting an exercise program.  Why exercise?  Exercising regularly offers many healthy rewards. It can help you do all of the following:    Improve your blood cholesterol level to help prevent further heart trouble    Lower your blood pressure to help prevent a stroke or heart attack    Control diabetes, or reduce your risk of getting this disease    Improve your heart and lung function    Reach and maintain a healthy weight    Make your muscles stronger and more limber so you can stay active    Prevent falls and fractures by slowing the loss of bone mass (osteoporosis)    Manage stress better    Reduce your blood pressure    Improve your sense of self and your body image  Exercise tips  Ease into your routine. Set small goals. Then build on them.  Exercise on most days. Aim for a total of 150 or more minutes of moderate to  vigorous intensity activity each week. Consider 40 minutes, 3 to 4 times a week. For best results, activity should last for 40 minutes on average. It is OK to work up to the 40 minute period over time. Examples of moderate-intensity activity is walking 1 mile in 15 minutes or 30 to 45 minutes of yard work.  Step up your daily activity level. Along with your exercise program, try being more active throughout the day. Walk instead of drive. Do more household tasks or yard work.  Choose one or more  activities you enjoy. Walking is one of the easiest things you can do. You can also try swimming, riding a bike, dancing, or taking an exercise class.  Stop exercising and call your doctor if you:    Have chest pain or feel dizzy or lightheaded    Feel burning, tightness, pressure, or heaviness in your chest, neck, shoulders, back, or arms    Have unusual shortness of breath    Have increased joint or muscle pain    Have palpitations or an irregular heartbeat  Date Last Reviewed: 5/1/2016 2000-2019 [employer]. [address] [place] [id number]. All rights reserved. This information is not intended as a substitute for professional medical care. Always follow your healthcare professional's instructions.

## 2020-02-26 DIAGNOSIS — I10 ESSENTIAL HYPERTENSION: ICD-10-CM

## 2020-02-26 DIAGNOSIS — M81.0 OSTEOPOROSIS, UNSPECIFIED OSTEOPOROSIS TYPE, UNSPECIFIED PATHOLOGICAL FRACTURE PRESENCE: ICD-10-CM

## 2020-02-26 DIAGNOSIS — E78.5 HYPERLIPIDEMIA LDL GOAL <100: ICD-10-CM

## 2020-02-26 LAB
ANION GAP SERPL CALCULATED.3IONS-SCNC: 9 MMOL/L (ref 3–14)
BUN SERPL-MCNC: 20 MG/DL (ref 7–30)
CALCIUM SERPL-MCNC: 9.2 MG/DL (ref 8.5–10.1)
CHLORIDE SERPL-SCNC: 102 MMOL/L (ref 94–109)
CHOLEST SERPL-MCNC: 170 MG/DL
CO2 SERPL-SCNC: 28 MMOL/L (ref 20–32)
CREAT SERPL-MCNC: 0.7 MG/DL (ref 0.52–1.04)
GFR SERPL CREATININE-BSD FRML MDRD: >90 ML/MIN/{1.73_M2}
GLUCOSE SERPL-MCNC: 109 MG/DL (ref 70–99)
HDLC SERPL-MCNC: 45 MG/DL
LDLC SERPL CALC-MCNC: 88 MG/DL
MAGNESIUM SERPL-MCNC: 2.1 MG/DL (ref 1.6–2.3)
NONHDLC SERPL-MCNC: 125 MG/DL
PHOSPHATE SERPL-MCNC: 3.1 MG/DL (ref 2.5–4.5)
POTASSIUM SERPL-SCNC: 3.3 MMOL/L (ref 3.4–5.3)
PTH-INTACT SERPL-MCNC: 56 PG/ML (ref 18–80)
SODIUM SERPL-SCNC: 139 MMOL/L (ref 133–144)
TRIGL SERPL-MCNC: 184 MG/DL

## 2020-02-26 PROCEDURE — 83970 ASSAY OF PARATHORMONE: CPT | Performed by: INTERNAL MEDICINE

## 2020-02-26 PROCEDURE — 80061 LIPID PANEL: CPT | Performed by: INTERNAL MEDICINE

## 2020-02-26 PROCEDURE — 84100 ASSAY OF PHOSPHORUS: CPT | Performed by: INTERNAL MEDICINE

## 2020-02-26 PROCEDURE — 36415 COLL VENOUS BLD VENIPUNCTURE: CPT | Performed by: INTERNAL MEDICINE

## 2020-02-26 PROCEDURE — 80048 BASIC METABOLIC PNL TOTAL CA: CPT | Performed by: INTERNAL MEDICINE

## 2020-02-26 PROCEDURE — 83735 ASSAY OF MAGNESIUM: CPT | Performed by: INTERNAL MEDICINE

## 2020-02-26 PROCEDURE — 82306 VITAMIN D 25 HYDROXY: CPT | Performed by: INTERNAL MEDICINE

## 2020-02-27 LAB — DEPRECATED CALCIDIOL+CALCIFEROL SERPL-MC: 65 UG/L (ref 20–75)

## 2020-03-02 ENCOUNTER — ANCILLARY PROCEDURE (OUTPATIENT)
Dept: BONE DENSITY | Facility: CLINIC | Age: 65
End: 2020-03-02
Attending: INTERNAL MEDICINE
Payer: COMMERCIAL

## 2020-03-02 DIAGNOSIS — M81.0 OSTEOPOROSIS, UNSPECIFIED OSTEOPOROSIS TYPE, UNSPECIFIED PATHOLOGICAL FRACTURE PRESENCE: ICD-10-CM

## 2020-03-02 DIAGNOSIS — M81.0 OSTEOPOROSIS: ICD-10-CM

## 2020-03-02 PROCEDURE — 77081 DXA BONE DENSITY APPENDICULR: CPT | Performed by: INTERNAL MEDICINE

## 2020-03-02 PROCEDURE — 77080 DXA BONE DENSITY AXIAL: CPT | Mod: XU | Performed by: INTERNAL MEDICINE

## 2020-07-09 DIAGNOSIS — J30.1 SEASONAL ALLERGIC RHINITIS DUE TO POLLEN: ICD-10-CM

## 2020-07-09 RX ORDER — CETIRIZINE HYDROCHLORIDE 10 MG/1
TABLET, FILM COATED ORAL
Qty: 90 TABLET | Refills: 0 | Status: SHIPPED | OUTPATIENT
Start: 2020-07-09

## 2020-07-09 NOTE — TELEPHONE ENCOUNTER
Routing refill request to provider for review/approval because:  Failed due to patient's age

## 2020-12-17 DIAGNOSIS — M81.0 OSTEOPOROSIS, UNSPECIFIED OSTEOPOROSIS TYPE, UNSPECIFIED PATHOLOGICAL FRACTURE PRESENCE: ICD-10-CM

## 2020-12-20 RX ORDER — ALENDRONATE SODIUM 70 MG/1
TABLET ORAL
Qty: 12 TABLET | Refills: 0 | Status: SHIPPED | OUTPATIENT
Start: 2020-12-20 | End: 2021-01-26

## 2020-12-23 DIAGNOSIS — I10 ESSENTIAL HYPERTENSION: ICD-10-CM

## 2020-12-23 DIAGNOSIS — E78.5 HYPERLIPIDEMIA, UNSPECIFIED HYPERLIPIDEMIA TYPE: ICD-10-CM

## 2020-12-26 RX ORDER — HYDROCHLOROTHIAZIDE 25 MG/1
25 TABLET ORAL DAILY
Qty: 90 TABLET | Refills: 0 | Status: SHIPPED | OUTPATIENT
Start: 2020-12-26 | End: 2021-01-26

## 2020-12-26 RX ORDER — ATORVASTATIN CALCIUM 20 MG/1
TABLET, FILM COATED ORAL
Qty: 90 TABLET | Refills: 0 | Status: SHIPPED | OUTPATIENT
Start: 2020-12-26 | End: 2021-01-26

## 2021-01-26 ENCOUNTER — OFFICE VISIT (OUTPATIENT)
Dept: FAMILY MEDICINE | Facility: CLINIC | Age: 66
End: 2021-01-26
Payer: COMMERCIAL

## 2021-01-26 VITALS
SYSTOLIC BLOOD PRESSURE: 136 MMHG | HEART RATE: 86 BPM | HEIGHT: 64 IN | BODY MASS INDEX: 36.54 KG/M2 | OXYGEN SATURATION: 97 % | DIASTOLIC BLOOD PRESSURE: 88 MMHG | TEMPERATURE: 99 F | WEIGHT: 214 LBS

## 2021-01-26 DIAGNOSIS — E78.5 HYPERLIPIDEMIA, UNSPECIFIED HYPERLIPIDEMIA TYPE: ICD-10-CM

## 2021-01-26 DIAGNOSIS — Z00.00 ENCOUNTER FOR MEDICARE ANNUAL WELLNESS EXAM: Primary | ICD-10-CM

## 2021-01-26 DIAGNOSIS — E66.01 MORBID OBESITY (H): ICD-10-CM

## 2021-01-26 DIAGNOSIS — M81.0 OSTEOPOROSIS, UNSPECIFIED OSTEOPOROSIS TYPE, UNSPECIFIED PATHOLOGICAL FRACTURE PRESENCE: ICD-10-CM

## 2021-01-26 DIAGNOSIS — Z12.31 VISIT FOR SCREENING MAMMOGRAM: ICD-10-CM

## 2021-01-26 DIAGNOSIS — K21.00 GASTROESOPHAGEAL REFLUX DISEASE WITH ESOPHAGITIS WITHOUT HEMORRHAGE: ICD-10-CM

## 2021-01-26 DIAGNOSIS — I10 ESSENTIAL HYPERTENSION: ICD-10-CM

## 2021-01-26 PROCEDURE — 99397 PER PM REEVAL EST PAT 65+ YR: CPT | Performed by: NURSE PRACTITIONER

## 2021-01-26 RX ORDER — ATORVASTATIN CALCIUM 20 MG/1
20 TABLET, FILM COATED ORAL DAILY
Qty: 90 TABLET | Refills: 3 | Status: SHIPPED | OUTPATIENT
Start: 2021-01-26 | End: 2022-02-14

## 2021-01-26 RX ORDER — ALENDRONATE SODIUM 70 MG/1
TABLET ORAL
Qty: 12 TABLET | Refills: 3 | Status: SHIPPED | OUTPATIENT
Start: 2021-01-26 | End: 2022-01-12

## 2021-01-26 RX ORDER — OMEPRAZOLE 40 MG/1
40 CAPSULE, DELAYED RELEASE ORAL DAILY
Qty: 90 CAPSULE | Refills: 3 | Status: CANCELLED | OUTPATIENT
Start: 2021-01-26

## 2021-01-26 RX ORDER — HYDROCHLOROTHIAZIDE 25 MG/1
25 TABLET ORAL DAILY
Qty: 90 TABLET | Refills: 3 | Status: SHIPPED | OUTPATIENT
Start: 2021-01-26 | End: 2022-02-14

## 2021-01-26 ASSESSMENT — ENCOUNTER SYMPTOMS
HEMATOCHEZIA: 0
WEAKNESS: 0
JOINT SWELLING: 0
COUGH: 0
EYE PAIN: 0
HEARTBURN: 0
BREAST MASS: 0
PARESTHESIAS: 0
CHILLS: 0
SORE THROAT: 0
ABDOMINAL PAIN: 0
DYSURIA: 0
DIARRHEA: 0
HEMATURIA: 0
FREQUENCY: 0
SHORTNESS OF BREATH: 0
DIZZINESS: 0
ARTHRALGIAS: 0
FEVER: 0
MYALGIAS: 0
CONSTIPATION: 0
HEADACHES: 0
PALPITATIONS: 0
NAUSEA: 0
NERVOUS/ANXIOUS: 0

## 2021-01-26 ASSESSMENT — MIFFLIN-ST. JEOR: SCORE: 1488.45

## 2021-01-26 ASSESSMENT — ACTIVITIES OF DAILY LIVING (ADL): CURRENT_FUNCTION: NO ASSISTANCE NEEDED

## 2021-01-26 ASSESSMENT — PAIN SCALES - GENERAL: PAINLEVEL: NO PAIN (0)

## 2021-01-26 NOTE — PATIENT INSTRUCTIONS
We are working hard to begin vaccinating more people against COVID-19. Currently, we are only vaccinating Phase 1a workers - healthcare workers who are unable to do their job remotely. Vaccine availability is very limited.      If you are a healthcare worker and you are unable to do your job remotely, please log in to [employer] using this link to see if we have openings and schedule an appointment. At your vaccine appointment, you will be asked to provide proof of employment as a health care worker. If you cannot, you will be turned away.     Vaccine appointments are being added as they become available. Please check your [employer] account frequently for availability.  If you have technical difficulty using [employer], call [phone] for assistance.     You can learn more about the state's phased approach to administering the vaccine, with details on each phase, here.      Phase 1b is the next group that will get vaccinated and includes frontline essential workers and adults 75 years of age and older. When we are able to start vaccinating this group, we will share that information on our website. Check this website to stay up to date on COVID-19 vaccination information.    Patient Education   Personalized Prevention Plan  You are due for the preventive services outlined below.  Your care team is available to assist you in scheduling these services.  If you have already completed any of these items, please share that information with your care team to update in your medical record.  Health Maintenance Due   Topic Date Due     Mammogram  11/21/2019     PHQ-2  01/01/2021     Annual Wellness Visit  02/25/2021     FALL RISK ASSESSMENT  02/25/2021

## 2021-01-26 NOTE — PROGRESS NOTES
SUBJECTIVE:   Masha Chery is a 65 year old female who presents for Preventive Visit.      Patient has been advised of split billing requirements and indicates understanding: Yes   Are you in the first 12 months of your Medicare coverage?  No    HPI  Do you feel safe in your environment? Yes    Have you ever done Advance Care Planning? (For example, a Health Directive, POLST, or a discussion with a medical provider or your loved ones about your wishes): Yes, patient states has an Advance Care Planning document and will bring a copy to the clinic.       Fall risk  Fallen 2 or more times in the past year?: No  Any fall with injury in the past year?: No    Cognitive Screening   1) Repeat 3 items (Leader, Season, Table)    2) Clock draw: NORMAL  3) 3 item recall: Recalls 3 objects  Results: 3 items recalled: COGNITIVE IMPAIRMENT LESS LIKELY    Mini-CogTM Copyright S [employer]. Licensed by the author for use in [facility]; reprinted with permission (blair@[facility]). All rights reserved.      Do you have sleep apnea, excessive snoring or daytime drowsiness?: no    Reviewed and updated as needed this visit by clinical staff  Tobacco  Allergies  Meds              Reviewed and updated as needed this visit by Provider                Social History     Tobacco Use     Smoking status: Former Smoker     Types: Cigarettes     Start date: 1973     Quit date: 1983     Years since quittin.9     Smokeless tobacco: Never Used   Substance Use Topics     Alcohol use: No     Frequency: Never         Alcohol Use 2020   Prescreen: >3 drinks/day or >7 drinks/week? Not Applicable   Prescreen: >3 drinks/day or >7 drinks/week? -         Hyperlipidemia Follow-Up      Are you regularly taking any medication or supplement to lower your cholesterol?   Yes- atorvastatin    Are you having muscle aches or other side effects that you think could be caused by your cholesterol lowering medication?   "No    Hypertension Follow-up      Do you check your blood pressure regularly outside of the clinic? No     Are you following a low salt diet? Yes    Are your blood pressures ever more than 140 on the top number (systolic) OR more   than 90 on the bottom number (diastolic), for example 140/90? No      Current providers sharing in care for this patient include:Patient Care Team:  No Ref-Primary, Physician as PCP - General  Jim Santoyo MD as Assigned PCP    The following health maintenance items are reviewed in Epic and correct as of today:  Health Maintenance   Topic Date Due     MAMMO SCREENING  11/21/2019     PHQ-2  01/01/2021     MEDICARE ANNUAL WELLNESS VISIT  02/25/2021     FALL RISK ASSESSMENT  02/25/2021     COLORECTAL CANCER SCREENING  01/19/2025     LIPID  02/26/2025     ADVANCE CARE PLANNING  02/27/2025     DTAP/TDAP/TD IMMUNIZATION (4 - Td) 09/16/2029     DEXA  03/02/2035     HEPATITIS C SCREENING  Completed     INFLUENZA VACCINE  Completed     Pneumococcal Vaccine: 65+ Years  Completed     ZOSTER IMMUNIZATION  Completed     Pneumococcal Vaccine: Pediatrics (0 to 5 Years) and At-Risk Patients (6 to 64 Years)  Aged Out     IPV IMMUNIZATION  Aged Out     MENINGITIS IMMUNIZATION  Aged Out     HEPATITIS B IMMUNIZATION  Aged Out       Mammogram Screening: Recommended mammography every 1-2 years with patient discussion and risk factor consideration    Review of Systems  Constitutional, HEENT, cardiovascular, pulmonary, GI, , musculoskeletal, neuro, skin, endocrine and psych systems are negative, except as otherwise noted.    OBJECTIVE:   /88   Pulse 86   Temp 99  F (37.2  C) (Oral)   Ht 1.614 m (5' 3.54\")   Wt 97.1 kg (214 lb)   SpO2 97%   BMI 37.26 kg/m   Estimated body mass index is 37.26 kg/m  as calculated from the following:    Height as of this encounter: 1.614 m (5' 3.54\").    Weight as of this encounter: 97.1 kg (214 lb).  Physical Exam  GENERAL: healthy, alert and no distress  EYES: " Eyes grossly normal to inspection, PERRL and conjunctivae and sclerae normal  HENT: ear canals and TM's normal, nose and mouth without ulcers or lesions  NECK: no adenopathy, no asymmetry, masses, or scars, thyroid normal to palpation and no carotid bruits  RESP: lungs clear to auscultation - no rales, rhonchi or wheezes  BREAST: normal without masses, tenderness or nipple discharge and no palpable axillary masses or adenopathy  CV: regular rate and rhythm, normal S1 S2, no S3 or S4, no murmur, click or rub, no peripheral edema and peripheral pulses strong  ABDOMEN: soft, nontender, no hepatosplenomegaly, no masses and bowel sounds normal  MS: no gross musculoskeletal defects noted, no edema  NEURO: Normal strength and tone, mentation intact and speech normal  PSYCH: mentation appears normal, affect normal/bright    Diagnostic Test Results:  none     ASSESSMENT / PLAN:   1. Encounter for Medicare annual wellness exam      2. Morbid obesity (H)  Patient to work on exercise.    3. Essential hypertension  Stable.  Continue current treatment plan and medications.   - hydrochlorothiazide (HYDRODIURIL) 25 MG tablet; Take 1 tablet (25 mg) by mouth daily  Dispense: 90 tablet; Refill: 3  - **Basic metabolic panel FUTURE anytime; Future  - **Hemoglobin FUTURE anytime; Future    4. Gastroesophageal reflux disease with esophagitis without hemorrhage  Patient stopped taking omeprazole.  Continue to monitor at this time.    5. Hyperlipidemia, unspecified hyperlipidemia type  .Stable.  Continue current treatment plan and medications.   - atorvastatin (LIPITOR) 20 MG tablet; Take 1 tablet (20 mg) by mouth daily  Dispense: 90 tablet; Refill: 3  - Lipid panel reflex to direct LDL Fasting; Future    6. Osteoporosis, unspecified osteoporosis type, unspecified pathological fracture presence  Stable.  Continue current treatment plan and medications.   - alendronate (FOSAMAX) 70 MG tablet; TAKE 1 TABLET BY MOUTH  EVERY WEEK  Dispense: 12  "tablet; Refill: 3    7. Visit for screening mammogram    - *MA Screening Digital Bilateral; Future    Patient has been advised of split billing requirements and indicates understanding: Yes  COUNSELING:  Reviewed preventive health counseling, as reflected in patient instructions       Regular exercise       Healthy diet/nutrition       Osteoporosis prevention/bone health    Estimated body mass index is 37.26 kg/m  as calculated from the following:    Height as of this encounter: 1.614 m (5' 3.54\").    Weight as of this encounter: 97.1 kg (214 lb).    Weight management plan: Discussed healthy diet and exercise guidelines    She reports that she quit smoking about 37 years ago. Her smoking use included cigarettes. She started smoking about 47 years ago. She has never used smokeless tobacco.      Appropriate preventive services were discussed with this patient, including applicable screening as appropriate for cardiovascular disease, diabetes, osteopenia/osteoporosis, and glaucoma.  As appropriate for age/gender, discussed screening for colorectal cancer, prostate cancer, breast cancer, and cervical cancer. Checklist reviewing preventive services available has been given to the patient.    Reviewed patients plan of care and provided an AVS. The Basic Care Plan (routine screening as documented in Health Maintenance) for Masha meets the Care Plan requirement. This Care Plan has been established and reviewed with the Patient.    Counseling Resources:  ATP IV Guidelines  Pooled Cohorts Equation Calculator  Breast Cancer Risk Calculator  Breast Cancer: Medication to Reduce Risk  FRAX Risk Assessment  ICSI Preventive Guidelines  Dietary Guidelines for Americans, 2010  USDA's MyPlate  ASA Prophylaxis  Lung CA Screening    PURA Cochran CNP  [facility]    Identified Health Risks:  "

## 2021-01-29 ENCOUNTER — ANCILLARY PROCEDURE (OUTPATIENT)
Dept: MAMMOGRAPHY | Facility: CLINIC | Age: 66
End: 2021-01-29
Attending: NURSE PRACTITIONER
Payer: COMMERCIAL

## 2021-01-29 DIAGNOSIS — Z12.31 VISIT FOR SCREENING MAMMOGRAM: ICD-10-CM

## 2021-01-29 PROCEDURE — 77067 SCR MAMMO BI INCL CAD: CPT | Mod: TC | Performed by: RADIOLOGY

## 2021-02-11 DIAGNOSIS — I10 ESSENTIAL HYPERTENSION: ICD-10-CM

## 2021-02-11 DIAGNOSIS — E78.5 HYPERLIPIDEMIA, UNSPECIFIED HYPERLIPIDEMIA TYPE: ICD-10-CM

## 2021-02-11 LAB
ANION GAP SERPL CALCULATED.3IONS-SCNC: 5 MMOL/L (ref 3–14)
BUN SERPL-MCNC: 18 MG/DL (ref 7–30)
CALCIUM SERPL-MCNC: 8.9 MG/DL (ref 8.5–10.1)
CHLORIDE SERPL-SCNC: 107 MMOL/L (ref 94–109)
CHOLEST SERPL-MCNC: 165 MG/DL
CO2 SERPL-SCNC: 29 MMOL/L (ref 20–32)
CREAT SERPL-MCNC: 0.81 MG/DL (ref 0.52–1.04)
GFR SERPL CREATININE-BSD FRML MDRD: 75 ML/MIN/{1.73_M2}
GLUCOSE SERPL-MCNC: 108 MG/DL (ref 70–99)
HDLC SERPL-MCNC: 52 MG/DL
HGB BLD-MCNC: 13.6 G/DL (ref 11.7–15.7)
LDLC SERPL CALC-MCNC: 89 MG/DL
NONHDLC SERPL-MCNC: 113 MG/DL
POTASSIUM SERPL-SCNC: 3.6 MMOL/L (ref 3.4–5.3)
SODIUM SERPL-SCNC: 141 MMOL/L (ref 133–144)
TRIGL SERPL-MCNC: 123 MG/DL

## 2021-02-11 PROCEDURE — 36415 COLL VENOUS BLD VENIPUNCTURE: CPT | Performed by: NURSE PRACTITIONER

## 2021-02-11 PROCEDURE — 80061 LIPID PANEL: CPT | Performed by: NURSE PRACTITIONER

## 2021-02-11 PROCEDURE — 80048 BASIC METABOLIC PNL TOTAL CA: CPT | Performed by: NURSE PRACTITIONER

## 2021-02-11 PROCEDURE — 85018 HEMOGLOBIN: CPT | Performed by: NURSE PRACTITIONER

## 2021-02-11 NOTE — RESULT ENCOUNTER NOTE
Dear Masha,    Your recent test results are attached.      No anemia.    If you have any questions please feel free to contact [phone] or myself via [employer].    Sincerely,  Claudia Salinas, CNP

## 2021-02-11 NOTE — LETTER
February 12, 2021      Masha Chery  [address] [place] [id number]        Dear ,    We are writing to inform you of your test results.    No anemia.     If you have any questions please feel free to contact [phone] or myself via [email or web address].       Resulted Orders   **Hemoglobin FUTURE anytime   Result Value Ref Range    Hemoglobin 13.6 11.7 - 15.7 g/dL       If you have any questions or concerns, please call the clinic at the number listed above.       Sincerely,      PURA Echavarria CNP/purdy

## 2021-02-15 PROBLEM — R73.01 IMPAIRED FASTING GLUCOSE: Status: ACTIVE | Noted: 2021-02-15

## 2021-03-03 ENCOUNTER — TELEPHONE (OUTPATIENT)
Dept: INTERNAL MEDICINE | Facility: CLINIC | Age: 66
End: 2021-03-03

## 2021-03-03 NOTE — TELEPHONE ENCOUNTER
Spoke with pt. States she had 12 teeth pulled yesterday. Decided on her own to not take Alendronate last Friday. Knows that this medication is for osteoporosis and helps with moving bone structures around.Should she start medication again on Friday, or should she continue to be off of this medication for another 1-2 weeks? Dentist and surgeon told her to contact PCP.  Please advise.    Rut Fernando RN  [facility]

## 2021-03-03 NOTE — TELEPHONE ENCOUNTER
Let's have patient hold alendronate for the next month to ensure everything heals well.    Claudia Salinas, CNP

## 2021-03-03 NOTE — TELEPHONE ENCOUNTER
Patient notified of Provider's message as written.  Patient verbalized understanding.    Francie Hendrix RN  [facility]

## 2021-03-14 DIAGNOSIS — K21.00 GASTROESOPHAGEAL REFLUX DISEASE WITH ESOPHAGITIS: ICD-10-CM

## 2021-03-17 NOTE — CONFIDENTIAL NOTE
"Pending Prescriptions:                       Disp   Refills    omeprazole (PRILOSEC) 40 MG DR capsule [P*90 cap*3            Sig: TAKE 1 CAPSULE BY MOUTH  DAILY    Routing refill request to provider for review/approval because:  Drug not active on patient's medication list  Pt has dx of osteoporosis    Cande Daniels RN    Requested Prescriptions   Pending Prescriptions Disp Refills     omeprazole (PRILOSEC) 40 MG DR capsule [Pharmacy Med Name: OMEPRAZOLE  40MG  CAP] 90 capsule 3     Sig: TAKE 1 CAPSULE BY MOUTH  DAILY       PPI Protocol Failed - 3/14/2021  9:33 PM        Failed - No diagnosis of osteoporosis on record        Failed - Medication is active on med list        Passed - Not on Clopidogrel (unless Pantoprazole ordered)        Passed - Recent (12 mo) or future (30 days) visit within the authorizing provider's specialty     Patient has had an office visit with the authorizing provider or a provider within the authorizing providers department within the previous 12 mos or has a future within next 30 days. See \"Patient Info\" tab in inbasket, or \"Choose Columns\" in Meds & Orders section of the refill encounter.              Passed - Patient is age 18 or older        Passed - No active pregnacy on record        Passed - No positive pregnancy test in past 12 months                   "

## 2021-03-18 RX ORDER — OMEPRAZOLE 40 MG/1
CAPSULE, DELAYED RELEASE ORAL
Qty: 90 CAPSULE | Refills: 3 | OUTPATIENT
Start: 2021-03-18

## 2021-03-18 NOTE — TELEPHONE ENCOUNTER
Spoke to patient and it was an auto request from pharmacy.  Rut BOOTHE CMA ([facility])

## 2021-03-30 DIAGNOSIS — K21.00 GASTROESOPHAGEAL REFLUX DISEASE WITH ESOPHAGITIS, UNSPECIFIED WHETHER HEMORRHAGE: ICD-10-CM

## 2021-04-02 RX ORDER — OMEPRAZOLE 40 MG/1
CAPSULE, DELAYED RELEASE ORAL
Qty: 90 CAPSULE | Refills: 3 | Status: SHIPPED | OUTPATIENT
Start: 2021-04-02 | End: 2022-05-02

## 2021-04-02 NOTE — TELEPHONE ENCOUNTER
Please advise if this needs to be added back onto med list- as PRN?    Francie Hendrix RN  [facility]

## 2021-04-16 ENCOUNTER — NURSE TRIAGE (OUTPATIENT)
Dept: FAMILY MEDICINE | Facility: CLINIC | Age: 66
End: 2021-04-16

## 2021-04-16 NOTE — TELEPHONE ENCOUNTER
Patient's spouse called with patient present.  Patient reports having increased fatigue, generalized weakness, nausea with no emesis and temperature of 101.1 degrees at this time.  Patient received the 2nd dose of the Pfizer vaccine yesterday.  Patient denies any other symptoms or concerns at this time.    Patient was advised to continue managing the symptoms at home and seek medical assistance if the symptoms persist or worsen.    Patient verbalized understanding and has no further questions or concerns at this time.      Reason for Disposition    COVID-19 vaccine, systemic reactions (e.g., fatigue, fever, muscle aches), questions about    COVID-19 vaccine, Frequently Asked Questions (FAQs)    Additional Information    Negative: [1] Difficulty breathing or swallowing AND [2] starts within 2 hours after injection    Negative: Sounds like a life-threatening emergency to the triager    Negative: [1] COVID-19 exposure AND [2] no symptoms    Negative: [1] Typical COVID-19 symptoms AND [2] symptoms that are NOT expected from vaccine (e.g., cough, difficulty breathing, loss of taste or smell, runny nose, sore throat)    Negative: [1] Typical COVID-19 symptoms AND [2] started > 3 days after getting vaccine    Negative: Fever > 104 F (40 C)    Negative: Sounds like a severe, unusual reaction to the triager    Negative: [1] Redness or red streak around the injection site AND [2] started > 48 hours after getting vaccine AND [3] fever    Negative: [1] Fever > 101 F (38.3 C) AND [2] age > 60 AND [3] started > 48 hours after getting vaccine    Negative: [1] Fever > 100.0 F (37.8 C) AND [2] bedridden (e.g., nursing home patient, CVA, chronic illness, recovering from surgery) AND [3] started > 48 hours after getting vaccine    Negative: [1] Fever > 100.0 F (37.8 C) AND [2] diabetes mellitus or weak immune system (e.g., HIV positive, cancer chemo, splenectomy, organ transplant, chronic steroids) AND [3] started > 48 hours after  "getting vaccine    Negative: [1] Redness or red streak around the injection site AND [2] started > 48 hours after getting vaccine AND [3] no fever  (Exception: red area < 1 inch or 2.5 cm wide)    Negative: [1] Pain, tenderness, or swelling at the injection site AND [2] over 3 days (72 hours) since vaccine AND [3] getting worse    Negative: Fever > 100.0 F (37.8 C) present > 3 days (72 hours)    Negative: [1] Fever > 100.0 F (37.8 C) AND [2] healthcare worker    Negative: [1] Pain, tenderness, or swelling at the injection site AND [2] lasts > 7 days    Negative: COVID-19 vaccine, injection site reaction (e.g., pain, redness, swelling), question about    Negative: [1] Requesting COVID-19 vaccine AND [2] healthcare worker (e.g., EMS first responders, doctors, nurses)    Negative: [1] Requesting COVID-19 vaccine AND [2] resident of a long-term care facility (e.g., nursing home)    Negative: [1] Requesting COVID-19 vaccine AND [2] vaccine available in the community for this patient group    Answer Assessment - Initial Assessment Questions  1. MAIN CONCERN OR SYMPTOM:  \"What is your main concern right now?\" \"What question do you have?\" \"What's the main symptom you're worried about?\" (e.g., fever, pain, redness, swelling)     Increased fatigue, nausea with no emesis, fever 101.1 degrees  2. VACCINE: \"What vaccination did you receive?\" \"Is this your first or second shot?\" (e.g., none; Moderna, Pfizer, other)      2nd dose of Pfizer  3. SYMPTOM ONSET: \"When did the fatigue, nausea, fever begin?\" (e.g., not relevant; hours, days)       now  4. SYMPTOM SEVERITY: \"How bad is it?\"       Fatigue, nausea, fever  5. FEVER: \"Is there a fever?\" If so, ask: \"What is it, how was it measured, and when did it start?\"       now  6. PAST REACTIONS: \"Have you reacted to immunizations before?\" If so, ask: \"What happened?\"      No  7. OTHER SYMPTOMS: \"Do you have any other symptoms?\"      No    Protocols used: CORONAVIRUS (COVID-19) VACCINE " QUESTIONS AND [id number] 1.3

## 2021-05-31 ENCOUNTER — RECORDS - HEALTHEAST (OUTPATIENT)
Dept: ADMINISTRATIVE | Facility: CLINIC | Age: 66
End: 2021-05-31

## 2021-06-01 ENCOUNTER — RECORDS - HEALTHEAST (OUTPATIENT)
Dept: ADMINISTRATIVE | Facility: CLINIC | Age: 66
End: 2021-06-01

## 2022-01-04 ENCOUNTER — OFFICE VISIT (OUTPATIENT)
Dept: FAMILY MEDICINE | Facility: CLINIC | Age: 67
End: 2022-01-04
Payer: COMMERCIAL

## 2022-01-04 VITALS
HEART RATE: 80 BPM | OXYGEN SATURATION: 97 % | TEMPERATURE: 98.4 F | BODY MASS INDEX: 34.29 KG/M2 | SYSTOLIC BLOOD PRESSURE: 134 MMHG | HEIGHT: 63 IN | WEIGHT: 193.5 LBS | DIASTOLIC BLOOD PRESSURE: 80 MMHG

## 2022-01-04 DIAGNOSIS — B07.0 PLANTAR WARTS: Primary | ICD-10-CM

## 2022-01-04 PROCEDURE — 17110 DESTRUCTION B9 LES UP TO 14: CPT | Performed by: PHYSICIAN ASSISTANT

## 2022-01-04 ASSESSMENT — MIFFLIN-ST. JEOR: SCORE: 1386.09

## 2022-01-04 NOTE — PROGRESS NOTES
"  Assessment & Plan     Plantar warts  May use home treatments starting tomorrow. Repeat in 2 weeks if needed.   - DESTRUCT BENIGN LESION, UP TO 14             BMI:   Estimated body mass index is 33.99 kg/m  as calculated from the following:    Height as of this encounter: 1.607 m (5' 3.27\").    Weight as of this encounter: 87.8 kg (193 lb 8 oz).           Return in about 2 weeks (around 1/18/2022) for Physical Exam.    Maritza Llanos PA-C  [facility] Flanagan is a 66 year old who presents for the following health issues  accompanied by her self.    HPI     Warts  Onset/Duration: 3-4 weeks  Description (location/number): 1 wart on right foot 2nd toe  Accompanying signs and symptoms (pain, redness): YES- pain and pressure  History: prior warts: YES- at age 6, did have wart on fingers  Therapies tried and outcome: OTC meds      Review of Systems   Constitutional, HEENT, cardiovascular, pulmonary, gi and gu systems are negative, except as otherwise noted.      Objective    /80   Pulse 80   Temp 98.4  F (36.9  C) (Oral)   Ht 1.607 m (5' 3.27\")   Wt 87.8 kg (193 lb 8 oz)   SpO2 97%   BMI 33.99 kg/m    Body mass index is 33.99 kg/m .  Physical Exam   Right 2nd digit with a wart on the lateral aspect of the toe. Treated with cryotherapy.                 "

## 2022-01-10 ENCOUNTER — TRANSFERRED RECORDS (OUTPATIENT)
Dept: HEALTH INFORMATION MANAGEMENT | Facility: CLINIC | Age: 67
End: 2022-01-10
Payer: COMMERCIAL

## 2022-01-11 DIAGNOSIS — M81.0 OSTEOPOROSIS, UNSPECIFIED OSTEOPOROSIS TYPE, UNSPECIFIED PATHOLOGICAL FRACTURE PRESENCE: ICD-10-CM

## 2022-01-12 RX ORDER — ALENDRONATE SODIUM 70 MG/1
TABLET ORAL
Qty: 12 TABLET | Refills: 3 | Status: SHIPPED | OUTPATIENT
Start: 2022-01-12 | End: 2022-06-01

## 2022-01-18 ENCOUNTER — OFFICE VISIT (OUTPATIENT)
Dept: FAMILY MEDICINE | Facility: CLINIC | Age: 67
End: 2022-01-18
Payer: COMMERCIAL

## 2022-01-18 VITALS
HEART RATE: 64 BPM | WEIGHT: 197 LBS | DIASTOLIC BLOOD PRESSURE: 85 MMHG | OXYGEN SATURATION: 98 % | SYSTOLIC BLOOD PRESSURE: 126 MMHG | BODY MASS INDEX: 34.6 KG/M2

## 2022-01-18 DIAGNOSIS — R82.994 HYPERCALCIURIA, IDIOPATHIC: ICD-10-CM

## 2022-01-18 DIAGNOSIS — M80.00XD OSTEOPOROSIS WITH CURRENT PATHOLOGICAL FRACTURE WITH ROUTINE HEALING, UNSPECIFIED OSTEOPOROSIS TYPE, SUBSEQUENT ENCOUNTER: Primary | ICD-10-CM

## 2022-01-18 DIAGNOSIS — Z78.0 ASYMPTOMATIC MENOPAUSAL STATE: ICD-10-CM

## 2022-01-18 DIAGNOSIS — E78.5 HYPERLIPIDEMIA LDL GOAL <100: ICD-10-CM

## 2022-01-18 DIAGNOSIS — I10 ESSENTIAL HYPERTENSION: ICD-10-CM

## 2022-01-18 DIAGNOSIS — E55.9 VITAMIN D DEFICIENCY: ICD-10-CM

## 2022-01-18 LAB
ALBUMIN SERPL-MCNC: 3.8 G/DL (ref 3.4–5)
ALP SERPL-CCNC: 69 U/L (ref 40–150)
ALT SERPL W P-5'-P-CCNC: 36 U/L (ref 0–50)
ANION GAP SERPL CALCULATED.3IONS-SCNC: 5 MMOL/L (ref 3–14)
AST SERPL W P-5'-P-CCNC: 24 U/L (ref 0–45)
BILIRUB SERPL-MCNC: 0.5 MG/DL (ref 0.2–1.3)
BUN SERPL-MCNC: 15 MG/DL (ref 7–30)
CALCIUM SERPL-MCNC: 9.3 MG/DL (ref 8.5–10.1)
CHLORIDE BLD-SCNC: 104 MMOL/L (ref 94–109)
CHOLEST SERPL-MCNC: 172 MG/DL
CO2 SERPL-SCNC: 31 MMOL/L (ref 20–32)
CREAT SERPL-MCNC: 0.75 MG/DL (ref 0.52–1.04)
DEPRECATED CALCIDIOL+CALCIFEROL SERPL-MC: 73 UG/L (ref 20–75)
FASTING STATUS PATIENT QL REPORTED: ABNORMAL
GFR SERPL CREATININE-BSD FRML MDRD: 87 ML/MIN/1.73M2
GLUCOSE BLD-MCNC: 113 MG/DL (ref 70–99)
HDLC SERPL-MCNC: 51 MG/DL
HGB BLD-MCNC: 14.6 G/DL (ref 11.7–15.7)
LDLC SERPL CALC-MCNC: 90 MG/DL
NONHDLC SERPL-MCNC: 121 MG/DL
POTASSIUM BLD-SCNC: 3.4 MMOL/L (ref 3.4–5.3)
PROT SERPL-MCNC: 7.4 G/DL (ref 6.8–8.8)
SODIUM SERPL-SCNC: 140 MMOL/L (ref 133–144)
TRIGL SERPL-MCNC: 154 MG/DL

## 2022-01-18 PROCEDURE — 80061 LIPID PANEL: CPT | Performed by: INTERNAL MEDICINE

## 2022-01-18 PROCEDURE — 82306 VITAMIN D 25 HYDROXY: CPT | Performed by: INTERNAL MEDICINE

## 2022-01-18 PROCEDURE — 36415 COLL VENOUS BLD VENIPUNCTURE: CPT | Performed by: INTERNAL MEDICINE

## 2022-01-18 PROCEDURE — 85018 HEMOGLOBIN: CPT | Performed by: INTERNAL MEDICINE

## 2022-01-18 PROCEDURE — 80053 COMPREHEN METABOLIC PANEL: CPT | Performed by: INTERNAL MEDICINE

## 2022-01-18 PROCEDURE — G0438 PPPS, INITIAL VISIT: HCPCS | Performed by: INTERNAL MEDICINE

## 2022-01-18 ASSESSMENT — ENCOUNTER SYMPTOMS
ABDOMINAL PAIN: 0
SHORTNESS OF BREATH: 0
DIZZINESS: 0
SORE THROAT: 0
NERVOUS/ANXIOUS: 0
PALPITATIONS: 0
DYSURIA: 0
HEARTBURN: 0
WEAKNESS: 0
MYALGIAS: 0
HEMATURIA: 0
NAUSEA: 0
FEVER: 0
CHILLS: 0
PARESTHESIAS: 0
HEMATOCHEZIA: 0
COUGH: 0
EYE PAIN: 0
ARTHRALGIAS: 0
BREAST MASS: 0
FREQUENCY: 0
HEADACHES: 0
CONSTIPATION: 0
JOINT SWELLING: 0
DIARRHEA: 0

## 2022-01-18 ASSESSMENT — ACTIVITIES OF DAILY LIVING (ADL): CURRENT_FUNCTION: NO ASSISTANCE NEEDED

## 2022-01-18 NOTE — PROGRESS NOTES
"SUBJECTIVE:   Masha Chery is a 66 year old female who presents for Preventive Visit.    Patient has been advised of split billing requirements and indicates understanding: Yes   Are you in the first 12 months of your Medicare coverage?  No    Healthy Habits:     In general, how would you rate your overall health?  Good    Frequency of exercise:  1 day/week    Duration of exercise:  15-30 minutes    Do you usually eat at least 4 servings of fruit and vegetables a day, include whole grains    & fiber and avoid regularly eating high fat or \"junk\" foods?  Yes    Taking medications regularly:  Yes    Medication side effects:  None    Ability to successfully perform activities of daily living:  No assistance needed    Home Safety:  No safety concerns identified    Hearing Impairment:  No hearing concerns    In the past 6 months, have you been bothered by leaking of urine?  No    In general, how would you rate your overall mental or emotional health?  Good      PHQ-2 Total Score: 0    Additional concerns today:  No  1/10/2022    House call b a 1 c 5.5  2016 broke femur 2010 off the fosamax.  Taking calcium and vitamin d  2015 had prolia then stopped at that time   [id number] .      Wart treatent on the tow     Do you feel safe in your environment? Yes    Have you ever done Advance Care Planning? (For example, a Health Directive, POLST, or a discussion with a medical provider or your loved ones about your wishes): No, advance care planning information given to patient to review.  Patient plans to discuss their wishes with loved ones or provider.       plate in the mouth and   Off the alendronate  Off and   Fall risk  Fallen 2 or more times in the past year?: No  Any fall with injury in the past year?: No    Cognitive Screening   1) Repeat 3 items (Leader, Season, Table)    2) Clock draw: NORMAL  3) 3 item recall: Recalls 3 objects  Results: 3 items recalled: COGNITIVE IMPAIRMENT LESS LIKELY    Mini-CogTM Copyright " LAURI Esquivel. Licensed by the author for use in [facility]; reprinted with permission (blair@[facility]). All rights reserved.      Do you have sleep apnea, excessive snoring or daytime drowsiness?: no    Reviewed and updated as needed this visit by clinical staff  Tobacco  Allergies  Meds             Reviewed and updated as needed this visit by Provider               Social History     Tobacco Use     Smoking status: Former Smoker     Types: Cigarettes     Start date: 1973     Quit date: 1983     Years since quittin.9     Smokeless tobacco: Never Used   Substance Use Topics     Alcohol use: No     If you drink alcohol do you typically have >3 drinks per day or >7 drinks per week? Not applicable    Alcohol Use 2022   Prescreen: >3 drinks/day or >7 drinks/week? Not Applicable   Prescreen: >3 drinks/day or >7 drinks/week? -   No flowsheet data found.        Current providers sharing in care for this patient include:   Patient Care Team:  No Ref-Primary, Physician as PCP - Claudia Ross, PURA MORRIS as Assigned PCP    The following health maintenance items are reviewed in Epic and correct as of today:  There are no preventive care reminders to display for this patient.  Lab work is in process  Labs reviewed in EPIC  BP Readings from Last 3 Encounters:   22 126/85   22 134/80   21 136/88    Wt Readings from Last 3 Encounters:   22 89.4 kg (197 lb)   22 87.8 kg (193 lb 8 oz)   21 97.1 kg (214 lb)                  Patient Active Problem List   Diagnosis     Eczematous dermatitis     Essential hypertension     Hypercalciuria, idiopathic     Hyperlipidemia     Osteoporosis     Morbid obesity (H)     Impaired fasting glucose     Past Surgical History:   Procedure Laterality Date     CHOLECYSTECTOMY       FEMUR SURGERY Right     Following fracture, macho and screws in place     HC REMOVAL GALLBLADDER      Description: Cholecystectomy;   Recorded: 2012;     HYSTERECTOMY, PAP NO LONGER INDICATED         Social History     Tobacco Use     Smoking status: Former Smoker     Types: Cigarettes     Start date: 1973     Quit date: 1983     Years since quittin.9     Smokeless tobacco: Never Used   Substance Use Topics     Alcohol use: No     Family History   Problem Relation Age of Onset     Diabetes Mother      Cerebrovascular Disease Father      Coronary Artery Disease Brother 51     Cerebrovascular Disease Sister          Current Outpatient Medications   Medication Sig Dispense Refill     alendronate (FOSAMAX) 70 MG tablet TAKE 1 TABLET BY MOUTH  WEEKLY 12 tablet 3     aspirin (ASA) 325 MG EC tablet Take 325 mg by mouth daily       atorvastatin (LIPITOR) 20 MG tablet Take 1 tablet (20 mg) by mouth daily 90 tablet 3     B Complex Vitamins (VITAMIN B COMPLEX PO) Take 1 tablet by mouth daily       Bioflavonoid Products (VITAMIN C PLUS) 1000 MG TABS Take 1 tablet by mouth daily       calcium carbonate-vitamin D ( CALCIUM 500/VITAMIN D3) 500-400 MG-UNIT tablet Take 1 tablet by mouth daily       cyclobenzaprine (FLEXERIL) 5 MG tablet Take 1 tablet (5 mg) by mouth nightly as needed for muscle spasms (Patient taking differently: Take 40 mg by mouth nightly as needed for muscle spasms ) 30 tablet 11     hydrochlorothiazide (HYDRODIURIL) 25 MG tablet Take 1 tablet (25 mg) by mouth daily 90 tablet 3     MAGNESIUM PO Take 250 mg by mouth daily       Multiple Vitamins-Minerals (MULTIVITAMIN WOMEN) TABS Take 1 tablet by mouth daily       Omega-3 Fatty Acids (FISH OIL) 600 MG CAPS Take 1 capsule by mouth daily       omeprazole (PRILOSEC) 40 MG DR capsule TAKE 1 CAPSULE BY MOUTH  DAILY 90 capsule 3     triamcinolone (KENALOG) 0.1 % external cream APPLY TOPICALLY TWO TIMES  DAILY 45 g 1     EQ ALLERGY RELIEF, CETIRIZINE, 10 MG tablet Take 1 tablet by mouth once daily (Patient not taking: Reported on 2022) 90 tablet 0     Allergies   Allergen  "Reactions     Citric Acid Hives     Foods that contain citric acid.      Renacidin Rash     SHE'S ALLERGIC TO CITRIC ACIDS AND SOUR FOODS     Mammogram Screening: Mammogram Screening: Recommended mammography every 1-2 years with patient discussion and risk factor consideration    Breast CA Risk Assessment (FHS-7) 1/18/2022   Do you have a family history of breast, colon, or ovarian cancer? No / Unknown       click delete button to remove this line now  Mammogram Screening: Recommended mammography every 1-2 years with patient discussion and risk factor consideration  Pertinent mammograms are reviewed under the imaging tab.    Review of Systems   Constitutional: Negative for chills and fever.   HENT: Negative for congestion, ear pain, hearing loss and sore throat.    Eyes: Negative for pain and visual disturbance.   Respiratory: Negative for cough and shortness of breath.    Cardiovascular: Negative for chest pain, palpitations and peripheral edema.   Gastrointestinal: Negative for abdominal pain, constipation, diarrhea, heartburn, hematochezia and nausea.   Breasts:  Negative for tenderness, breast mass and discharge.   Genitourinary: Negative for dysuria, frequency, genital sores, hematuria, pelvic pain, urgency, vaginal bleeding and vaginal discharge.   Musculoskeletal: Negative for arthralgias, joint swelling and myalgias.   Skin: Negative for rash.   Neurological: Negative for dizziness, weakness, headaches and paresthesias.   Psychiatric/Behavioral: Negative for mood changes. The patient is not nervous/anxious.      Constitutional, HEENT, cardiovascular, pulmonary, gi and gu systems are negative, except as otherwise noted.    OBJECTIVE:   /85 (BP Location: Right arm, Patient Position: Chair, Cuff Size: Adult Regular)   Pulse 64   Wt 89.4 kg (197 lb)   SpO2 98%   BMI 34.60 kg/m   Estimated body mass index is 34.6 kg/m  as calculated from the following:    Height as of 1/4/22: 1.607 m (5' 3.27\").    " Weight as of this encounter: 89.4 kg (197 lb).  Physical Exam  GENERAL: healthy, alert and no distress  EYES: Eyes grossly normal to inspection, PERRL and conjunctivae and sclerae normal  HENT: ear canals and TM's normal, nose and mouth without ulcers or lesions  NECK: no adenopathy, no asymmetry, masses, or scars and thyroid normal to palpation  RESP: lungs clear to auscultation - no rales, rhonchi or wheezes  CV: regular rate and rhythm, normal S1 S2, no S3 or S4, no murmur, click or rub, no peripheral edema and peripheral pulses strong  ABDOMEN: soft, nontender, no hepatosplenomegaly, no masses and bowel sounds normal  MS: no gross musculoskeletal defects noted, no edema  SKIN: no suspicious lesions or rashes  NEURO: Normal strength and tone, mentation intact and speech normal  BACK: no CVA tenderness, no paralumbar tenderness  PSYCH: mentation appears normal, affect normal/bright  LYMPH: no cervical, supraclavicular, axillary, or inguinal adenopathy    Diagnostic Test Results:  Labs reviewed in Epic  Results for orders placed or performed in visit on 01/18/22   Vitamin D Deficiency     Status: Normal   Result Value Ref Range    Vitamin D, Total (25-Hydroxy) 73 20 - 75 ug/L    Narrative    Season, race, dietary intake, and treatment affect the concentration of 25-hydroxy-Vitamin D. Values may decrease during winter months and increase during summer months. Values 20-29 ug/L may indicate Vitamin D insufficiency and values <20 ug/L may indicate Vitamin D deficiency.    Vitamin D determination is routinely performed by an immunoassay specific for 25 hydroxyvitamin D3.  If an individual is on vitamin D2(ergocalciferol) supplementation, please specify 25 OH vitamin D2 and D3 level determination by LCMSMS test [id number].     Comprehensive metabolic panel (BMP + Alb, Alk Phos, ALT, AST, Total. Bili, TP)     Status: Abnormal   Result Value Ref Range    Sodium 140 133 - 144 mmol/L    Potassium 3.4 3.4 - 5.3 mmol/L     Chloride 104 94 - 109 mmol/L    Carbon Dioxide (CO2) 31 20 - 32 mmol/L    Anion Gap 5 3 - 14 mmol/L    Urea Nitrogen 15 7 - 30 mg/dL    Creatinine 0.75 0.52 - 1.04 mg/dL    Calcium 9.3 8.5 - 10.1 mg/dL    Glucose 113 (H) 70 - 99 mg/dL    Alkaline Phosphatase 69 40 - 150 U/L    AST 24 0 - 45 U/L    ALT 36 0 - 50 U/L    Protein Total 7.4 6.8 - 8.8 g/dL    Albumin 3.8 3.4 - 5.0 g/dL    Bilirubin Total 0.5 0.2 - 1.3 mg/dL    GFR Estimate 87 >60 mL/min/1.73m2   Lipid panel reflex to direct LDL Fasting     Status: Abnormal   Result Value Ref Range    Cholesterol 172 <200 mg/dL    Triglycerides 154 (H) <150 mg/dL    Direct Measure HDL 51 >=50 mg/dL    LDL Cholesterol Calculated 90 <=100 mg/dL    Non HDL Cholesterol 121 <130 mg/dL    Patient Fasting > 8hrs? Unknown     Narrative    Cholesterol  Desirable:  <200 mg/dL    Triglycerides  Normal:  Less than 150 mg/dL  Borderline High:  150-199 mg/dL  High:  200-499 mg/dL  Very High:  Greater than or equal to 500 mg/dL    Direct Measure HDL  Female:  Greater than or equal to 50 mg/dL   Male:  Greater than or equal to 40 mg/dL    LDL Cholesterol  Desirable:  <100mg/dL  Above Desirable:  100-129 mg/dL   Borderline High:  130-159 mg/dL   High:  160-189 mg/dL   Very High:  >= 190 mg/dL    Non HDL Cholesterol  Desirable:  130 mg/dL  Above Desirable:  130-159 mg/dL  Borderline High:  160-189 mg/dL  High:  190-219 mg/dL  Very High:  Greater than or equal to 220 mg/dL   Hemoglobin     Status: Normal   Result Value Ref Range    Hemoglobin 14.6 11.7 - 15.7 g/dL       ASSESSMENT / PLAN:   Masha was seen today for physical.    Diagnoses and all orders for this visit:    Osteoporosis with current pathological fracture with routine healing, unspecified osteoporosis type, subsequent encounter  -     REVIEW OF HEALTH MAINTENANCE PROTOCOL ORDERS  -     DX Hip/Pelvis/Spine; Future    Asymptomatic menopausal state   -     DX Hip/Pelvis/Spine; Future    Hypercalciuria, idiopathic  -      "Hemoglobin; Future  -     Hemoglobin    Essential hypertension  -     Comprehensive metabolic panel (BMP + Alb, Alk Phos, ALT, AST, Total. Bili, TP); Future  -     Comprehensive metabolic panel (BMP + Alb, Alk Phos, ALT, AST, Total. Bili, TP)    Vitamin D deficiency  -     Vitamin D Deficiency; Future  -     Vitamin D Deficiency    Hyperlipidemia LDL goal <100  -     Lipid panel reflex to direct LDL Fasting; Future  -     Lipid panel reflex to direct LDL Fasting        Patient has been advised of split billing requirements and indicates understanding: Yes  COUNSELING:  Reviewed preventive health counseling, as reflected in patient instructions       Regular exercise       Healthy diet/nutrition       Vision screening       Hearing screening       Colon cancer screening    Estimated body mass index is 34.6 kg/m  as calculated from the following:    Height as of 1/4/22: 1.607 m (5' 3.27\").    Weight as of this encounter: 89.4 kg (197 lb).    Weight management plan: Discussed healthy diet and exercise guidelines    She reports that she quit smoking about 38 years ago. Her smoking use included cigarettes. She started smoking about 48 years ago. She has never used smokeless tobacco.      Appropriate preventive services were discussed with this patient, including applicable screening as appropriate for cardiovascular disease, diabetes, osteopenia/osteoporosis, and glaucoma.  As appropriate for age/gender, discussed screening for colorectal cancer, prostate cancer, breast cancer, and cervical cancer. Checklist reviewing preventive services available has been given to the patient.    Reviewed patients plan of care and provided an AVS. The Basic Care Plan (routine screening as documented in Health Maintenance) for Masha meets the Care Plan requirement. This Care Plan has been established and reviewed with the Patient.    ICD-10-CM    1. Osteoporosis with current pathological fracture with routine healing, unspecified " osteoporosis type, subsequent encounter  M80.00XD REVIEW OF HEALTH MAINTENANCE PROTOCOL ORDERS     DX Hip/Pelvis/Spine   2. Asymptomatic menopausal state   Z78.0 DX Hip/Pelvis/Spine   3. Hypercalciuria, idiopathic  R82.994 Hemoglobin     Hemoglobin   4. Essential hypertension  I10 Comprehensive metabolic panel (BMP + Alb, Alk Phos, ALT, AST, Total. Bili, TP)     Comprehensive metabolic panel (BMP + Alb, Alk Phos, ALT, AST, Total. Bili, TP)   5. Vitamin D deficiency  E55.9 Vitamin D Deficiency     Vitamin D Deficiency   6. Hyperlipidemia LDL goal <100  E78.5 Lipid panel reflex to direct LDL Fasting     Lipid panel reflex to direct LDL Fasting       Counseling Resources:  ATP IV Guidelines  Pooled Cohorts Equation Calculator  Breast Cancer Risk Calculator  Breast Cancer: Medication to Reduce Risk  FRAX Risk Assessment  ICSI Preventive Guidelines  Dietary Guidelines for Americans, 2010  USDA's MyPlate  ASA Prophylaxis  Lung CA Screening    Jim Santoyo MD  [facility]    Identified Health Risks:

## 2022-01-19 ENCOUNTER — TRANSFERRED RECORDS (OUTPATIENT)
Dept: HEALTH INFORMATION MANAGEMENT | Facility: CLINIC | Age: 67
End: 2022-01-19
Payer: COMMERCIAL

## 2022-02-12 DIAGNOSIS — I10 ESSENTIAL HYPERTENSION: ICD-10-CM

## 2022-02-12 DIAGNOSIS — E78.5 HYPERLIPIDEMIA, UNSPECIFIED HYPERLIPIDEMIA TYPE: ICD-10-CM

## 2022-02-14 RX ORDER — ATORVASTATIN CALCIUM 20 MG/1
TABLET, FILM COATED ORAL
Qty: 90 TABLET | Refills: 3 | Status: SHIPPED | OUTPATIENT
Start: 2022-02-14 | End: 2023-01-23

## 2022-02-14 RX ORDER — HYDROCHLOROTHIAZIDE 25 MG/1
TABLET ORAL
Qty: 90 TABLET | Refills: 3 | Status: SHIPPED | OUTPATIENT
Start: 2022-02-14 | End: 2023-01-23

## 2022-03-04 ENCOUNTER — ANCILLARY PROCEDURE (OUTPATIENT)
Dept: BONE DENSITY | Facility: CLINIC | Age: 67
End: 2022-03-04
Attending: INTERNAL MEDICINE
Payer: COMMERCIAL

## 2022-03-04 DIAGNOSIS — Z78.0 ASYMPTOMATIC MENOPAUSAL STATE: ICD-10-CM

## 2022-03-04 DIAGNOSIS — M80.00XD OSTEOPOROSIS WITH CURRENT PATHOLOGICAL FRACTURE WITH ROUTINE HEALING, UNSPECIFIED OSTEOPOROSIS TYPE, SUBSEQUENT ENCOUNTER: ICD-10-CM

## 2022-03-04 DIAGNOSIS — Z78.0 ASYMPTOMATIC POSTMENOPAUSAL STATUS: ICD-10-CM

## 2022-03-04 PROCEDURE — 77080 DXA BONE DENSITY AXIAL: CPT | Mod: XU | Performed by: INTERNAL MEDICINE

## 2022-03-04 PROCEDURE — 77081 DXA BONE DENSITY APPENDICULR: CPT | Performed by: INTERNAL MEDICINE

## 2022-04-21 ENCOUNTER — OFFICE VISIT (OUTPATIENT)
Dept: URGENT CARE | Facility: URGENT CARE | Age: 67
End: 2022-04-21
Payer: COMMERCIAL

## 2022-04-21 VITALS
BODY MASS INDEX: 32.85 KG/M2 | HEART RATE: 72 BPM | OXYGEN SATURATION: 95 % | SYSTOLIC BLOOD PRESSURE: 126 MMHG | WEIGHT: 187 LBS | TEMPERATURE: 98.2 F | DIASTOLIC BLOOD PRESSURE: 87 MMHG | RESPIRATION RATE: 16 BRPM

## 2022-04-21 DIAGNOSIS — S01.81XA LACERATION OF BROW WITHOUT COMPLICATION, INITIAL ENCOUNTER: Primary | ICD-10-CM

## 2022-04-21 PROCEDURE — 12011 RPR F/E/E/N/L/M 2.5 CM/<: CPT | Performed by: PHYSICIAN ASSISTANT

## 2022-04-21 NOTE — PROGRESS NOTES
SUBJECTIVE:     Chief Complaint   Patient presents with     Head Injury     Fell today about 3 o'clock on the sidewalk on her glasses and got a cut on left side forehead above the eye      Facial Laceration     Fell today about 3 o'clock on the sidewalk on her glasses and got a cut on left side forehead above the eye.     Masha Chery is a 66 year old female who presents to the clinic with a laceration on the left forehead above left brow sustained 3 hour(s) ago.  This is a accidental injury.    Mechanism of injury: fell.    Associated symptoms: Denies loss of consciousness, vomiting or confusion.  Denies numbness, weakness, or loss of function  Last tetanus booster within 10 years: yes    EXAM:   The patient appears today in alert,no apparent distress distress  VITALS: /87 (BP Location: Left arm, Patient Position: Sitting, Cuff Size: Adult Large)   Pulse 72   Temp 98.2  F (36.8  C) (Tympanic)   Resp 16   Wt 84.8 kg (187 lb)   SpO2 95%   BMI 32.85 kg/m      Size of laceration: 1 centimeters  Characteristics of the laceration: active bleeding, clean, straight and superficial  Tendon function intact: not applicable  Sensation to light touch intact: Yes  Pulses intact: yes  Picture included in patient's chart: no    Assessment:  (S01.81XA) Laceration of brow without complication, initial encounter  (primary encounter diagnosis)  Plan: REPAIR SUPERFICIAL, WOUND BODY < =2.5CM    PLAN:  PROCEDURE NOTE::  Prepped and draped in the usual sterile fashion  Wound cleaned with betadine/saline solution  Wound soaked  Wound irrigated  Dermabond was applied  After care instructions:  Keep wound clean and dry for the next 24-48 hours  Signs of infection discussed today  May return to work as long as wound is kept clean and dry  Discussed the probability of scarring

## 2022-06-01 ENCOUNTER — ANCILLARY PROCEDURE (OUTPATIENT)
Dept: GENERAL RADIOLOGY | Facility: CLINIC | Age: 67
End: 2022-06-01
Attending: FAMILY MEDICINE
Payer: COMMERCIAL

## 2022-06-01 ENCOUNTER — OFFICE VISIT (OUTPATIENT)
Dept: FAMILY MEDICINE | Facility: CLINIC | Age: 67
End: 2022-06-01

## 2022-06-01 VITALS
HEART RATE: 70 BPM | SYSTOLIC BLOOD PRESSURE: 112 MMHG | BODY MASS INDEX: 32.39 KG/M2 | TEMPERATURE: 98.7 F | RESPIRATION RATE: 14 BRPM | WEIGHT: 184.4 LBS | DIASTOLIC BLOOD PRESSURE: 75 MMHG | OXYGEN SATURATION: 96 %

## 2022-06-01 DIAGNOSIS — M25.562 LEFT KNEE PAIN, UNSPECIFIED CHRONICITY: ICD-10-CM

## 2022-06-01 DIAGNOSIS — S01.81XD LACERATION OF BROW WITHOUT COMPLICATION, SUBSEQUENT ENCOUNTER: ICD-10-CM

## 2022-06-01 DIAGNOSIS — Z91.81 HISTORY OF RECENT FALL: ICD-10-CM

## 2022-06-01 DIAGNOSIS — M25.552 HIP PAIN, LEFT: ICD-10-CM

## 2022-06-01 DIAGNOSIS — M84.750A ATYPICAL FEMORAL FRACTURE, UNSPECIFIED, INITIAL ENCOUNTER FOR FRACTURE: Primary | ICD-10-CM

## 2022-06-01 DIAGNOSIS — E66.01 MORBID OBESITY (H): ICD-10-CM

## 2022-06-01 DIAGNOSIS — M17.12 PRIMARY OSTEOARTHRITIS OF LEFT KNEE: ICD-10-CM

## 2022-06-01 DIAGNOSIS — Z87.39 HISTORY OF OSTEOPOROSIS: ICD-10-CM

## 2022-06-01 PROCEDURE — 73562 X-RAY EXAM OF KNEE 3: CPT | Mod: TC | Performed by: RADIOLOGY

## 2022-06-01 PROCEDURE — 99214 OFFICE O/P EST MOD 30 MIN: CPT | Performed by: FAMILY MEDICINE

## 2022-06-01 NOTE — PROGRESS NOTES
Assessment & Plan     Atypical femoral fracture, unspecified, initial encounter for fracture  - Orthopedic  Referral  - Discontinue oral Bisphosphonate (Fosamax)    History of recent fall with persistent left Hip and left Knee Pain  - XR Hip Left 2-3 Views  - XR Knee Left 3 Views    History of osteoporosis  - XR Hip Left 2-3 Views  - XR Knee Left 3 Views    Hip pain, left  - XR Hip Left 2-3 Views    Left knee pain, unspecified chronicity  - XR Knee Left 3 Views    Primary osteoarthritis of left knee  - Orthopedic  Referral    Laceration of brow without complication, subsequent encounter   Healed well. No complications.     Morbid obesity (H)  - Weight management plan: Patient was referred to their PCP to discuss a diet and exercise plan.            Return in about 2 weeks (around 6/15/2022), or if symptoms worsen or fail to improve.    Amrita Kennedy MD  [facility] Flanagan is a 66 year old who presents for the following health issues     HPI     ED/UC Followup:    Facility:  [facility]  Date of visit: 4/21/22  Reason for visit: Laceration of brow without complication, initial encounter  Current Status: reporting left leg weakness since the fall.  Still also has pain with pressure in left eye brow      States that on 4/21/2022 she accidentally fell on the side walk- hit her left side and sustained a laceration above the left eye- had a laceration repair completed in Urgent care. See [facility] for details.   The laceration has since healed.     States that since the fall she's had left leg pain with weakness- most of the pain is over the hip and knee, describes it as intermittent deep dull aching pain with a pain score of about 5/10- takes OTC Tylenol prn pain.    Patient is concerned about a possible fracture due to her underlying history of Osteoporosis.  Currently on weekly Fosamax.   States that she has been walking aweight  bearing and not using any ambulatory device at this time.     States that her  brought her to the clinic today and is waiting in the car- did not want to to come to the clinic because he did not want to wear a mask.     Review of Systems   Constitutional, HEENT, cardiovascular, pulmonary, gi and gu systems are negative, except as otherwise noted.      Objective    /75   Pulse 70   Temp 98.7  F (37.1  C) (Tympanic)   Resp 14   Wt 83.6 kg (184 lb 6.4 oz)   SpO2 96%   Breastfeeding No   BMI 32.39 kg/m    Body mass index is 32.39 kg/m .  Physical Exam   GENERAL: healthy, alert and no distress  SKIN: Noted a healed skin left brow laceration- no complications.  RESP: lungs clear to auscultation - no rales, rhonchi or wheezes  MS: LLE exam shows normal strength and muscle mass, no deformities, no erythema, induration, or nodules, no evidence of joint effusion. Decreased ROM of the left lower extremity, tenderness over the left hip and knee.     DATA  Called and discussed results with patient  Results for orders placed or performed in visit on 06/01/22   XR Knee Left 3 Views     Status: None    Narrative    XR KNEE LEFT 3 VIEWS 6/1/2022 12:13 PM     HISTORY: History of recent fall; Left knee pain, unspecified  chronicity; History of osteoporosis    COMPARISON: None.       Impression    IMPRESSION: Tricompartmental degenerative changes, advanced in the  patellofemoral compartment. No fracture or joint effusion.    DARLINE ALONSO MD         SYSTEM ID:  [id number]   Results for orders placed or performed in visit on 06/01/22   XR Hip Left 2-3 Views     Status: None    Narrative    XR HIP LEFT 2-3 VIEWS 6/1/2022 12:08 PM     HISTORY: History of recent fall; Hip pain, left; History of  osteoporosis    COMPARISON: None.       Impression    IMPRESSION: Atypical femoral fracture proximal, lateral shaft where  there is cortical thickening and a subtle fracture lucency. This  fracture is associated with  bisphosphonate use.    NOTE: ABNORMAL REPORT    THE DICTATION ABOVE DESCRIBES AN ABNORMAL REPORT FOR WHICH FOLLOW-UP  IS NEEDED.    DARLINE ALONSO MD         SYSTEM ID:  [id number]

## 2022-06-03 NOTE — PROGRESS NOTES
SUBJECTIVE:   Masha Chery is a 66 year old female with a long history of osteoporosis, on bisphosphonates (now discontinued), who is seen in consultation at the request of Dr. Kennedy for evaluation of right lower extremity injury.   HPI: States that on 4/21/2022 she accidentally fell on the side walk- hit her left side and sustained a laceration above the left eye- had a laceration repair completed in Urgent care.     Present symptoms: pain in the posterior left pelvis region that has decreased. NO pain ever in the thigh. But she reports weakness in the left leg/ fatigue with walking or stairs.   No history of low back pain.    Orthopedic PMH: OP, history of right femoral rodding for fracture in 2010.    Review of Systems:  Constitutional:  NEGATIVE for fever, chills, change in weight  Integumentary/Skin:  NEGATIVE for worrisome rashes, moles or lesions  Eyes:  NEGATIVE for vision changes or irritation  ENT/Mouth:  NEGATIVE for ear, mouth and throat problems  Resp:  NEGATIVE for significant cough or SOB  Breast:  NEGATIVE for masses, tenderness or discharge  CV:  NEGATIVE for chest pain, palpitations or peripheral edema  GI:  NEGATIVE for nausea, abdominal pain, heartburn, or change in bowel habits  :  Negative   Musculoskeletal:  See HPI above  Neuro:  NEGATIVE for weakness, dizziness or paresthesias  Endocrine:  NEGATIVE for temperature intolerance, skin/hair changes  Heme/allergy/immune:  NEGATIVE for bleeding problems  Psychiatric:  NEGATIVE for changes in mood or affect    Past Medical History:   Past Medical History:   Diagnosis Date     Hyperlipidemia      Hypertension      Osteoporosis      Past Surgical History:   Past Surgical History:   Procedure Laterality Date     CHOLECYSTECTOMY       FEMUR SURGERY Right 2010    Following fracture, macho and screws in place     HC REMOVAL GALLBLADDER      Description: Cholecystectomy;  Recorded: 02/21/2012;     HYSTERECTOMY, PAP NO LONGER INDICATED    "    Family History:   Family History   Problem Relation Age of Onset     Diabetes Mother      Cerebrovascular Disease Father      Coronary Artery Disease Brother 51     Cerebrovascular Disease Sister      Social History:   Social History     Tobacco Use     Smoking status: Former Smoker     Types: Cigarettes     Start date: 1973     Quit date: 1983     Years since quittin.2     Smokeless tobacco: Never Used   Substance Use Topics     Alcohol use: No     OBJECTIVE:  Physical Exam:  /80 (BP Location: Left arm, Patient Position: Sitting, Cuff Size: Adult Regular)   Pulse 60   Ht 1.626 m (5' 4\")   Wt 83.5 kg (184 lb)   SpO2 97%   BMI 31.58 kg/m    General Appearance: healthy, alert and no distress   Skin: no suspicious lesions or rashes  Neuro: Normal strength and tone, mentation intact and speech normal  Vascular: good pulses, and cappillary refill   Lymph: no lymphadenopathy   Psych:  mentation appears normal and affect normal/bright  Resp: no increased work of breathing     Left Lower Extremity Exam:  Inspection: no swelling    ROM: of hip is painfree   Tender: greater trochanter, and left iliac wing.   Strength: mild strength deficits with hip flexion and abduction   Trendelenburg equivocal on left   Neg SLR  Non-tender over mid femur at all.     X-rays:  Obtained 22 of the left hip: 2-views, reviewed in the office with the patient by myself today and show     Atypical femoral fracture proximal, lateral shaft where  there is cortical thickening and a subtle fracture lucency. This  fracture is associated with bisphosphonate use.    The fracture appears old, with some remodeling of the femur. Healing or healed fracture, unicortical.    AP pelvis, and lumbar xrays:  No pelvis fracture   SI joint arthritis  Anterolisthesis of L4 on 5  Facet degenerative disease  Osteopenia but no compression fractures.     ASSESSMENT:   Old atypical unicortical femur fracture, left. I don't think this is " new.  I think the pain and weakness are unrelated.  Could be due to lumbar stenosis  Possible gluteus medius tear      PLAN:   Offered physical therapy. She would like an MRI first, and I did offer that as well.  MRI left hip ordered  Likely to physical therapy after that.  Consider bone scan to check for uptake around the femur unicortical fracture.    Return to clinic: Follow up in the office / virtual visit/ MyChart for the results.      PRAVEEN Adams MD  Dept. Orthopedic Surgery  [facility]

## 2022-06-07 ENCOUNTER — OFFICE VISIT (OUTPATIENT)
Dept: ORTHOPEDICS | Facility: CLINIC | Age: 67
End: 2022-06-07
Attending: FAMILY MEDICINE
Payer: COMMERCIAL

## 2022-06-07 VITALS
SYSTOLIC BLOOD PRESSURE: 131 MMHG | WEIGHT: 184 LBS | BODY MASS INDEX: 31.41 KG/M2 | DIASTOLIC BLOOD PRESSURE: 80 MMHG | HEART RATE: 60 BPM | OXYGEN SATURATION: 97 % | HEIGHT: 64 IN

## 2022-06-07 DIAGNOSIS — M17.12 PRIMARY OSTEOARTHRITIS OF LEFT KNEE: ICD-10-CM

## 2022-06-07 DIAGNOSIS — M84.750A ATYPICAL FRACTURE OF FEMUR, INITIAL ENCOUNTER: Primary | ICD-10-CM

## 2022-06-07 DIAGNOSIS — M54.59 MECHANICAL LOW BACK PAIN: ICD-10-CM

## 2022-06-07 PROCEDURE — 99204 OFFICE O/P NEW MOD 45 MIN: CPT | Performed by: ORTHOPAEDIC SURGERY

## 2022-06-07 ASSESSMENT — PAIN SCALES - GENERAL: PAINLEVEL: MODERATE PAIN (4)

## 2022-06-07 NOTE — LETTER
6/7/2022         RE: Masha Chery  [address] [address]        Dear Colleague,    Thank you for referring your patient, Masha Chery, to the [facility]. Please see a copy of my visit note below.    SUBJECTIVE:   Masha Chery is a 66 year old female with a long history of osteoporosis, on bisphosphonates (now discontinued), who is seen in consultation at the request of Dr. Kennedy for evaluation of right lower extremity injury.   HPI: States that on 4/21/2022 she accidentally fell on the side walk- hit her left side and sustained a laceration above the left eye- had a laceration repair completed in Urgent care.     Present symptoms: pain in the posterior left pelvis region that has decreased. NO pain ever in the thigh. But she reports weakness in the left leg/ fatigue with walking or stairs.   No history of low back pain.    Orthopedic PMH: OP, history of right femoral rodding for fracture in 2010.    Review of Systems:  Constitutional:  NEGATIVE for fever, chills, change in weight  Integumentary/Skin:  NEGATIVE for worrisome rashes, moles or lesions  Eyes:  NEGATIVE for vision changes or irritation  ENT/Mouth:  NEGATIVE for ear, mouth and throat problems  Resp:  NEGATIVE for significant cough or SOB  Breast:  NEGATIVE for masses, tenderness or discharge  CV:  NEGATIVE for chest pain, palpitations or peripheral edema  GI:  NEGATIVE for nausea, abdominal pain, heartburn, or change in bowel habits  :  Negative   Musculoskeletal:  See HPI above  Neuro:  NEGATIVE for weakness, dizziness or paresthesias  Endocrine:  NEGATIVE for temperature intolerance, skin/hair changes  Heme/allergy/immune:  NEGATIVE for bleeding problems  Psychiatric:  NEGATIVE for changes in mood or affect    Past Medical History:   Past Medical History:   Diagnosis Date     Hyperlipidemia      Hypertension      Osteoporosis      Past Surgical History:   Past Surgical History:   Procedure  "Laterality Date     CHOLECYSTECTOMY       FEMUR SURGERY Right     Following fracture, macho and screws in place     HC REMOVAL GALLBLADDER      Description: Cholecystectomy;  Recorded: 2012;     HYSTERECTOMY, PAP NO LONGER INDICATED       Family History:   Family History   Problem Relation Age of Onset     Diabetes Mother      Cerebrovascular Disease Father      Coronary Artery Disease Brother 51     Cerebrovascular Disease Sister      Social History:   Social History     Tobacco Use     Smoking status: Former Smoker     Types: Cigarettes     Start date: 1973     Quit date: 1983     Years since quittin.2     Smokeless tobacco: Never Used   Substance Use Topics     Alcohol use: No     OBJECTIVE:  Physical Exam:  /80 (BP Location: Left arm, Patient Position: Sitting, Cuff Size: Adult Regular)   Pulse 60   Ht 1.626 m (5' 4\")   Wt 83.5 kg (184 lb)   SpO2 97%   BMI 31.58 kg/m    General Appearance: healthy, alert and no distress   Skin: no suspicious lesions or rashes  Neuro: Normal strength and tone, mentation intact and speech normal  Vascular: good pulses, and cappillary refill   Lymph: no lymphadenopathy   Psych:  mentation appears normal and affect normal/bright  Resp: no increased work of breathing     Left Lower Extremity Exam:  Inspection: no swelling    ROM: of hip is painfree   Tender: greater trochanter, and left iliac wing.   Strength: mild strength deficits with hip flexion and abduction   Trendelenburg equivocal on left   Neg SLR  Non-tender over mid femur at all.     X-rays:  Obtained 22 of the left hip: 2-views, reviewed in the office with the patient by myself today and show     Atypical femoral fracture proximal, lateral shaft where  there is cortical thickening and a subtle fracture lucency. This  fracture is associated with bisphosphonate use.    The fracture appears old, with some remodeling of the femur. Healing or healed fracture, unicortical.    AP pelvis, and " lumbar xrays:  No pelvis fracture   SI joint arthritis  Anterolisthesis of L4 on 5  Facet degenerative disease  Osteopenia but no compression fractures.     ASSESSMENT:   Old atypical unicortical femur fracture, left. I don't think this is new.  I think the pain and weakness are unrelated.  Could be due to lumbar stenosis  Possible gluteus medius tear      PLAN:   Offered physical therapy. She would like an MRI first, and I did offer that as well.  MRI left hip ordered  Likely to physical therapy after that.  Consider bone scan to check for uptake around the femur unicortical fracture.    Return to clinic: Follow up in the office / virtual visit/ MyChart for the results.      PRAVEEN Adams MD  Dept. Orthopedic Surgery  [facility]           Again, thank you for allowing me to participate in the care of your patient.        Sincerely,        Greg Adams MD

## 2022-06-14 ENCOUNTER — ANCILLARY PROCEDURE (OUTPATIENT)
Dept: MRI IMAGING | Facility: CLINIC | Age: 67
End: 2022-06-14
Attending: ORTHOPAEDIC SURGERY
Payer: COMMERCIAL

## 2022-06-14 DIAGNOSIS — M54.59 MECHANICAL LOW BACK PAIN: ICD-10-CM

## 2022-06-14 DIAGNOSIS — M84.750A ATYPICAL FRACTURE OF FEMUR, INITIAL ENCOUNTER: ICD-10-CM

## 2022-06-14 PROCEDURE — 73721 MRI JNT OF LWR EXTRE W/O DYE: CPT | Mod: LT | Performed by: RADIOLOGY

## 2022-06-21 ENCOUNTER — TELEPHONE (OUTPATIENT)
Dept: ORTHOPEDICS | Facility: CLINIC | Age: 67
End: 2022-06-21
Payer: COMMERCIAL

## 2022-06-21 NOTE — TELEPHONE ENCOUNTER
MRI Report:  MRI left hip shows no evidence of fracture, bone necrosis or any infiltration or any effusion (essentially normal).  Old atypical unicortical femur fracture, left; not new per ortho.    Plan: Ortho had recommended PT after MRI; she needs to follow up with ortho with further questions.   Note: Previous call transferred to Orthopedic department by RN triage; so they might also reach out to her.

## 2022-06-21 NOTE — TELEPHONE ENCOUNTER
Patient called the clinic inquiring about the MRI results completed 6/14/22 and the provider recommendations.  Transferred to Orthopedic department.

## 2022-06-21 NOTE — TELEPHONE ENCOUNTER
Reason for Call:  Other call back     Detailed comments: BRENNA MEJIA CALLED BACK 12:06P 6/21/22 TO CHECK ON MRI TEST RESULTS AND MAKE SURE MESSAGE TO PROVIDER TEAM WAS RECEIVED SHE CALLED # PROVIDER BUT NO ANSWER OR OPTION TO LEAVE MESSAGE    Phone Number Patient can be reached at: Cell number on file:    Telephone Information:   Mobile [phone]       Best Time: ANYTIME    Can we leave a detailed message on this number? YES    Call taken on 6/21/2022 at 12:07 PM by Lisa Wallace MA

## 2022-06-21 NOTE — TELEPHONE ENCOUNTER
Called pt and informed of results offered PT pt declined. She will follow up in the clinic if she feels she is not getting better.  Nora Nicole CMA 6/21/2022 1:52 PM

## 2022-07-29 ENCOUNTER — OFFICE VISIT (OUTPATIENT)
Dept: FAMILY MEDICINE | Facility: CLINIC | Age: 67
End: 2022-07-29
Payer: COMMERCIAL

## 2022-07-29 ENCOUNTER — TELEPHONE (OUTPATIENT)
Dept: GASTROENTEROLOGY | Facility: CLINIC | Age: 67
End: 2022-07-29

## 2022-07-29 VITALS
BODY MASS INDEX: 31.55 KG/M2 | HEART RATE: 65 BPM | TEMPERATURE: 97.2 F | OXYGEN SATURATION: 97 % | RESPIRATION RATE: 20 BRPM | WEIGHT: 183.8 LBS | DIASTOLIC BLOOD PRESSURE: 83 MMHG | SYSTOLIC BLOOD PRESSURE: 129 MMHG

## 2022-07-29 DIAGNOSIS — Z11.52 ENCOUNTER FOR PREPROCEDURE SCREENING LABORATORY TESTING FOR COVID-19: Primary | ICD-10-CM

## 2022-07-29 DIAGNOSIS — R13.10 PAIN WITH SWALLOWING: Primary | ICD-10-CM

## 2022-07-29 DIAGNOSIS — R07.89 ATYPICAL CHEST PAIN: ICD-10-CM

## 2022-07-29 DIAGNOSIS — Z01.812 ENCOUNTER FOR PREPROCEDURE SCREENING LABORATORY TESTING FOR COVID-19: Primary | ICD-10-CM

## 2022-07-29 PROCEDURE — 0054A COVID-19,PF,PFIZER (12+ YRS): CPT | Performed by: NURSE PRACTITIONER

## 2022-07-29 PROCEDURE — 91305 COVID-19,PF,PFIZER (12+ YRS): CPT | Performed by: NURSE PRACTITIONER

## 2022-07-29 PROCEDURE — 93000 ELECTROCARDIOGRAM COMPLETE: CPT | Performed by: NURSE PRACTITIONER

## 2022-07-29 PROCEDURE — 99214 OFFICE O/P EST MOD 30 MIN: CPT | Mod: 25 | Performed by: NURSE PRACTITIONER

## 2022-07-29 RX ORDER — SUCRALFATE 1 G/1
1 TABLET ORAL 4 TIMES DAILY
Qty: 120 TABLET | Refills: 0 | Status: SHIPPED | OUTPATIENT
Start: 2022-07-29 | End: 2022-08-28

## 2022-07-29 ASSESSMENT — PAIN SCALES - GENERAL: PAINLEVEL: NO PAIN (0)

## 2022-07-29 NOTE — PROGRESS NOTES
Assessment & Plan     Atypical chest pain    - EKG 12-lead complete w/read - Clinics  - Adult GI  Referral - Procedure Only; Future    Pain with swallowing    - Adult GI  Referral - Procedure Only; Future  - sucralfate (CARAFATE) 1 GM tablet; Take 1 tablet (1 g) by mouth 4 times daily for 30 days    30 minutes spent on the date of the encounter doing chart review, history and exam, documentation and further activities per the note     See Patient Instructions: take medication as prescribed, schedule EGD, follow up if worsening symptoms/ ER.  Pt in agreement.     Return in about 4 weeks (around 8/26/2022), or if symptoms worsen or fail to improve.    JAQUI SLOAN  [facility] JULITO Flanagan is a 66 year old, presenting for the following health issues:  Pain    She reports pain in the center of chest, most of the time it is with eating/ swallowing. Pain lasts for a few seconds while swallowing what ever she is swallowing passes and then is gone. She does not have trouble swallowing.  She denies associated symptoms of chest pain- SOB, dizziness, diaphoresis, nausea, jaw pain, upper back pain (patient got dizzy for a few seconds while laying down for exam).  She states pain is not consistent.  She thinks she might have scratched her esophagus with a large vitamin she had swallowed but symptoms are not improving.  She takes omeprazole daily, tried tums; not sure if this helped.  She does have heart disease in her family. She was a smoker in the remote past. Discussed carafate, EGD and if worsening symptoms- ER (carafate only covered in pill form). Denies blood in stools/ coughing up or vomiting blood.     Pain    History of Present Illness       Reason for visit:  Chest soreness  Symptom onset:  3-4 weeks ago  Symptom intensity:  Moderate  Symptom progression:  Staying the same  Had these symptoms before:  No  What makes it worse:  Eating or drinking  What  makes it better:  No    She eats 2-3 servings of fruits and vegetables daily.She consumes 1 sweetened beverage(s) daily.She exercises with enough effort to increase her heart rate 10 to 19 minutes per day.  She exercises with enough effort to increase her heart rate 3 or less days per week.   She is taking medications regularly.       Review of Systems   Constitutional, HEENT, cardiovascular, pulmonary, GI, , musculoskeletal, neuro, skin, endocrine and psych systems are negative, except as otherwise noted.      Objective    /83   Pulse 65   Temp 97.2  F (36.2  C) (Tympanic)   Resp 20   Wt 83.4 kg (183 lb 12.8 oz)   SpO2 97%   BMI 31.55 kg/m    Body mass index is 31.55 kg/m .  Physical Exam   GENERAL: healthy, alert and no distress  RESP: lungs clear to auscultation - no rales, rhonchi or wheezes  CV: POSITIVE for bradycardia; irregularly irregular. no murmur, click or rub, no peripheral edema and peripheral pulses strong  ABDOMEN: soft, nontender, no hepatosplenomegaly, no masses and bowel sounds normal  MS: no gross musculoskeletal defects noted, no edema  NEURO: mentation intact and speech normal  PSYCH: mentation appears normal, affect normal/bright    See orders    EKG appears sinus bradycardia; possible t-wave abnormality non-specific; No ST elevation or depression. No previous EKGs for comparison.

## 2022-07-29 NOTE — TELEPHONE ENCOUNTER
Screening Questions    BlueKIND OF PREP RedLOCATION [review exclusion criteria] GreenSEDATION TYPE      1. Are you active on mychart? y    2. What insurance is in the chart? [facility]     3.   Ordering/Referring Provider: Alma Shen NP       4. BMI   (If greater than 40 review exclusion criteria [PAC APPT IF [MAC] @ [facility])  31.5  [If yes, BMI OVER 40-EXTENDED PREP]      **(Sedation review/consideration needed)**  Do you have a legal guardian or Medical Power of    and/or are you able to give consent for your medical care?     Can give consent    5. Have you had a positive covid test in the last 90 days?   n -     6.  Are you currently on dialysis?   n [ If yes, G-PREP & HOSPITAL setting ONLY]     7.  Do you have chronic kidney disease?  n [ If yes, G-PREP ]    8.   Do you have a diagnosis of diabetes?   n   [ If yes, G-PREP ]    9.  On a regular basis do you go 3-5 days between bowel movements?   n   [ If yes, EXTENDED PREP]    10.  Are you taking any prescription pain medications on a routine schedule?    n -  [ If yes, EXTENDED PREP] [If yes, MAC]      11.   Do you have any chemical dependencies such as alcohol, street drugs, or methadone?    n [If yes, MAC]    12.   Do you have any history of post-traumatic stress syndrome, severe anxiety or history of psychosis?    n  [If yes, MAC]    13.  [FEMALES] Are you currently pregnant? n    If yes, how many weeks?       Respiratory/Heart Screening:  [If yes to any of the following HOSPITAL setting only]     14. Do you have Pulmonary Hypertension [Lungs]?   n       15. Do you have UNCONTROLLED asthma?   n     16.  Do you use daily home oxygen?  n      17. Do you have mod to severe Obstructive Sleep Apnea?         (OKAY @ [facility]  [facility]  [facility]  PH  [facility]  [facility] - if pt is not on OXYGEN)  n      18.   Have you had a heart or lung transplant?   n      19.   Have you had a stroke or Transient ischemic attack (TIA - aka  mini stroke ) within 6 months?  (If  yes, please review exclusion criteria)  n     20.   In the past 6 months, have you had any heart related issues including cardiomyopathy or heart attack?   n           If yes, did it require cardiac stenting or other implantable device?         21.   Do you have any implantable devices in your body (pacemaker, defib, LVAD)? (If yes, please review exclusion criteria)  n   22.  Do you take the medication Phentermine?     Yes-> Hold for 7 days before procedure.  Please consult your prescribing provider if you have questions about holding this medication.     No-> Continue to next question.    23. Do you take nitroglycerin?   n           If yes, how often?   (if yes, HOSPITAL setting ONLY)    24.  Are you currently taking any blood thinners?    [If yes, INFORM patient to follw up w/ ORDERING PROVIDER FOR BRIDGING INSTRUCTIONS]     n    25.   Do you transfer independently?                (If NO, please HOSPITAL setting ONLY)  y    26.   Preferred LOCAL Pharmacy for Pre Prescription:         [facility] [address] [place] [address] [address] [facility]    Scheduling Details  (Please ask for phone number if not scheduled by patient)      Caller : Masha Chery    Date of Procedure: 9/6  Surgeon: Stevan  Location: [facility]        Sedation Type: MODERATE l   Conscious Sedation- Needs  for 6 hours after the procedure  MAC/General-Needs  for 24 hours after procedure    n :[Pre-op Required] at [facility]  MG and OR for MAC sedation   (advise patient they will need a pre-op WITH IN 30 DAYS of procedure date)     Type of Procedure Scheduled:   Upper Endoscopy [EGD]    Which Colonoscopy Prep was Sent?:    -     [facility] CF PATIENTS & [facility] PATIENTS NEEDS EXTENDED PREP       Informed patient they will need an adult  y  Cannot take any type of public or medical transportation alone    Pre-Procedure Covid test [facility] or Externally: y w/[facility]  **INFORMED OF HOME TESTING & LAB  OPTION**        Confirmed Nurse will call to complete assessment y    Additional comments:

## 2022-09-02 ENCOUNTER — LAB (OUTPATIENT)
Dept: LAB | Facility: CLINIC | Age: 67
End: 2022-09-02
Payer: COMMERCIAL

## 2022-09-02 DIAGNOSIS — Z11.52 ENCOUNTER FOR PREPROCEDURE SCREENING LABORATORY TESTING FOR COVID-19: ICD-10-CM

## 2022-09-02 DIAGNOSIS — Z01.812 ENCOUNTER FOR PREPROCEDURE SCREENING LABORATORY TESTING FOR COVID-19: ICD-10-CM

## 2022-09-02 LAB — SARS-COV-2 RNA RESP QL NAA+PROBE: NEGATIVE

## 2022-09-02 PROCEDURE — U0005 INFEC AGEN DETEC AMPLI PROBE: HCPCS

## 2022-09-02 PROCEDURE — U0003 INFECTIOUS AGENT DETECTION BY NUCLEIC ACID (DNA OR RNA); SEVERE ACUTE RESPIRATORY SYNDROME CORONAVIRUS 2 (SARS-COV-2) (CORONAVIRUS DISEASE [COVID-19]), AMPLIFIED PROBE TECHNIQUE, MAKING USE OF HIGH THROUGHPUT TECHNOLOGIES AS DESCRIBED BY CMS-2020-01-R: HCPCS

## 2022-09-06 ENCOUNTER — HOSPITAL ENCOUNTER (OUTPATIENT)
Facility: AMBULATORY SURGERY CENTER | Age: 67
Discharge: HOME | End: 2022-09-06
Attending: SURGERY | Admitting: SURGERY
Payer: COMMERCIAL

## 2022-09-06 VITALS
OXYGEN SATURATION: 95 % | BODY MASS INDEX: 31.24 KG/M2 | HEART RATE: 49 BPM | HEIGHT: 64 IN | RESPIRATION RATE: 16 BRPM | DIASTOLIC BLOOD PRESSURE: 67 MMHG | SYSTOLIC BLOOD PRESSURE: 90 MMHG | WEIGHT: 183 LBS | TEMPERATURE: 97.1 F

## 2022-09-06 LAB — UPPER GI ENDOSCOPY: NORMAL

## 2022-09-06 PROCEDURE — G8907 PT DOC NO EVENTS ON DISCHARG: HCPCS

## 2022-09-06 PROCEDURE — 43239 EGD BIOPSY SINGLE/MULTIPLE: CPT | Performed by: SURGERY

## 2022-09-06 PROCEDURE — 43239 EGD BIOPSY SINGLE/MULTIPLE: CPT

## 2022-09-06 PROCEDURE — G8918 PT W/O PREOP ORDER IV AB PRO: HCPCS

## 2022-09-06 RX ORDER — NALOXONE HYDROCHLORIDE 0.4 MG/ML
0.2 INJECTION, SOLUTION INTRAMUSCULAR; INTRAVENOUS; SUBCUTANEOUS
Status: DISCONTINUED | OUTPATIENT
Start: 2022-09-06 | End: 2022-09-07 | Stop reason: HOSPADM

## 2022-09-06 RX ORDER — LIDOCAINE 40 MG/G
CREAM TOPICAL
Status: DISCONTINUED | OUTPATIENT
Start: 2022-09-06 | End: 2022-09-07 | Stop reason: HOSPADM

## 2022-09-06 RX ORDER — ONDANSETRON 4 MG/1
4 TABLET, ORALLY DISINTEGRATING ORAL EVERY 6 HOURS PRN
Status: DISCONTINUED | OUTPATIENT
Start: 2022-09-06 | End: 2022-09-07 | Stop reason: HOSPADM

## 2022-09-06 RX ORDER — FLUMAZENIL 0.1 MG/ML
0.2 INJECTION, SOLUTION INTRAVENOUS
Status: ACTIVE | OUTPATIENT
Start: 2022-09-06 | End: 2022-09-06

## 2022-09-06 RX ORDER — PROCHLORPERAZINE MALEATE 5 MG
5 TABLET ORAL EVERY 6 HOURS PRN
Status: DISCONTINUED | OUTPATIENT
Start: 2022-09-06 | End: 2022-09-07 | Stop reason: HOSPADM

## 2022-09-06 RX ORDER — NALOXONE HYDROCHLORIDE 0.4 MG/ML
0.4 INJECTION, SOLUTION INTRAMUSCULAR; INTRAVENOUS; SUBCUTANEOUS
Status: DISCONTINUED | OUTPATIENT
Start: 2022-09-06 | End: 2022-09-07 | Stop reason: HOSPADM

## 2022-09-06 RX ORDER — ONDANSETRON 2 MG/ML
4 INJECTION INTRAMUSCULAR; INTRAVENOUS
Status: DISCONTINUED | OUTPATIENT
Start: 2022-09-06 | End: 2022-09-07 | Stop reason: HOSPADM

## 2022-09-06 RX ORDER — ONDANSETRON 2 MG/ML
4 INJECTION INTRAMUSCULAR; INTRAVENOUS EVERY 6 HOURS PRN
Status: DISCONTINUED | OUTPATIENT
Start: 2022-09-06 | End: 2022-09-07 | Stop reason: HOSPADM

## 2022-09-06 RX ORDER — FENTANYL CITRATE 50 UG/ML
INJECTION, SOLUTION INTRAMUSCULAR; INTRAVENOUS PRN
Status: DISCONTINUED | OUTPATIENT
Start: 2022-09-06 | End: 2022-09-06 | Stop reason: HOSPADM

## 2022-09-06 NOTE — H&P
Pre-Endoscopy History and Physical     Masha Chery MRN# [id number]   YOB: 1955 Age: 66 year old     Date of Procedure: 9/6/2022  Primary care provider: No Ref-Primary, Physician  Type of Endoscopy: esophagogastroduodenoscopy (upper GI endoscopy)  Reason for Procedure: odynophagia  Type of Anesthesia Anticipated: Moderate Sedation    HPI:    Masha is a 66 year old female who will be undergoing the above procedure.    midchest pain with swallowing, maybe something getting stuck but otherwise no dysphagia x 1 month    A history and physical has been performed. The patient's medications and allergies have been reviewed. The risks and benefits of the procedure and the sedation options and risks were discussed with the patient.  All questions were answered and informed consent was obtained.      She denies a personal or family history of anesthesia complications or bleeding disorders.     Allergies   Allergen Reactions     Citric Acid Hives     Foods that contain citric acid.      Renacidin Rash     SHE'S ALLERGIC TO CITRIC ACIDS AND SOUR FOODS        Cannot display prior to admission medications because the patient has not been admitted in this contact.     Current Outpatient Medications   Medication     aspirin (ASA) 325 MG EC tablet     atorvastatin (LIPITOR) 20 MG tablet     B Complex Vitamins (VITAMIN B COMPLEX PO)     Bioflavonoid Products (VITAMIN C PLUS) 1000 MG TABS     calcium carbonate-vitamin D (OS-GILDA) 500-400 MG-UNIT tablet     cyclobenzaprine (FLEXERIL) 5 MG tablet     EQ ALLERGY RELIEF, CETIRIZINE, 10 MG tablet     hydrochlorothiazide (HYDRODIURIL) 25 MG tablet     MAGNESIUM PO     Multiple Vitamins-Minerals (MULTIVITAMIN WOMEN) TABS     Omega-3 Fatty Acids (FISH OIL) 600 MG CAPS     omeprazole (PRILOSEC) 40 MG DR capsule     triamcinolone (KENALOG) 0.1 % external cream     Current Facility-Administered Medications   Medication     lidocaine (LMX4) kit     lidocaine 1 % 0.1-1 mL  "    ondansetron (ZOFRAN) injection 4 mg     sodium chloride (PF) 0.9% PF flush 3 mL     sodium chloride (PF) 0.9% PF flush 3 mL         Patient Active Problem List   Diagnosis     Eczematous dermatitis     Essential hypertension     Hypercalciuria, idiopathic     Hyperlipidemia     Osteoporosis     Morbid obesity (H)     Impaired fasting glucose        Past Medical History:   Diagnosis Date     Hyperlipidemia      Hypertension      Osteoporosis         Past Surgical History:   Procedure Laterality Date     CHOLECYSTECTOMY       FEMUR SURGERY Right     Following fracture, macho and screws in place     HC REMOVAL GALLBLADDER      Description: Cholecystectomy;  Recorded: 2012;     HYSTERECTOMY, PAP NO LONGER INDICATED         Social History     Tobacco Use     Smoking status: Former Smoker     Types: Cigarettes     Start date: 1973     Quit date: 1983     Years since quittin.5     Smokeless tobacco: Never Used   Substance Use Topics     Alcohol use: No       Family History   Problem Relation Age of Onset     Diabetes Mother      Cerebrovascular Disease Father      Coronary Artery Disease Brother 51     Cerebrovascular Disease Sister        REVIEW OF SYSTEMS:     5 point ROS negative except as noted above in HPI, including Gen., Resp., CV, GI &  system review.      PHYSICAL EXAM:   /74   Temp 97.1  F (36.2  C) (Temporal)   Resp 18   Ht 1.626 m (5' 4\")   Wt 83 kg (183 lb)   SpO2 97%   BMI 31.41 kg/m   Estimated body mass index is 31.41 kg/m  as calculated from the following:    Height as of this encounter: 1.626 m (5' 4\").    Weight as of this encounter: 83 kg (183 lb).   GENERAL APPEARANCE: healthy, alert and no distress  MENTAL STATUS: alert  AIRWAY EXAM: Mask on  RESP: lungs clear to auscultation - no rales, rhonchi or wheezes  CV: regular rates and rhythm      DIAGNOSTICS:    Not indicated      IMPRESSION   ASA Class 2 - Mild systemic disease        PLAN:       Plan for " esophagogastroduodenoscopy (upper GI endoscopy). We discussed the risks, benefits and alternatives and the patient wished to proceed.    The above has been forwarded to the consulting provider.      Signed Electronically by: Zion Calles MD  September 6, 2022

## 2022-09-06 NOTE — PROGRESS NOTES
"Patient became nauseous and sweaty upon sitting at edge of bed. Emesis of 50 ml clear liquid. Feels better after emesis.  C/o feeling tired and a little lightheaded.  Charge RN into to see patient.    Patient feeling a little better upon leaving--\"just tired\".  Discharged by wheelchair. BP stable in 90's. No further nausea or vomiting  "

## 2022-09-06 NOTE — LETTER
[facility]    [address] [place] [address]  [phone]                                   September 6, 2022    Masha Chery  [address]  [place] [address]          Moe Flanagan,     Thank you for your recent office visit.     Here are your recent results.  Per GI-   Impression:               - Normal oropharynx.                             - No gross lesions in the entire esophagus.                             - Z-line regular, 35 cm from the incisors.                             - Gastroesophageal flap valve classified as Hill                             Grade I (prominent fold, tight to endoscope).                             - Erosive gastropathy with no bleeding and no                             stigmata of recent bleeding. Biopsied.                             - No gross lesions in the entire examined duodenum.   Recommendation:           - Patient has a contact number available for                             emergencies. The signs and symptoms of potential                             delayed complications were discussed with the                             patient. Return to normal activities tomorrow.                             Written discharge instructions were provided to the                             patient.                             - Resume previous diet.                             - Continue present medications.                             - Await pathology results.                             - Return to primary care physician PRN.                                                                               Feel free to contact me via [employer] or call the clinic at [phone].                 Results for orders placed or performed during the hospital encounter of 09/06/22   UPPER GI ENDOSCOPY     Status: None   Result Value Ref Range    Upper GI Endoscopy       [facility]  Endoscopy Department-[facility]  [place]  _______________________________________________________________________________  Patient Name: Masha Chery       Procedure Date: 9/6/2022 9:35 AM  MRN: [id number]                       YOB: 1955  Admit Type: Outpatient                Age: 66  Gender: Female                        Note Status: Finalized  Attending MD: Zion Calles ,   Instrument Name: [id number]- [id number]  _______________________________________________________________________________     Procedure:                Upper GI endoscopy  Indications:              Odynophagia  Providers:                Zion Calles  Referring MD:             CLARENCE DURAN NP  Medicines:                Midazolam 3 mg IV, Fentanyl 150 micrograms IV,                             Benzocaine spray  Complications:            No immediate complications.  _________________________________________________________________________ ______  Procedure:                Pre-Anesthesia Assessment:                            - Prior to the procedure, a History and Physical                             was performed, and patient medications and                             allergies were reviewed. The risks and benefits of                             the procedure and the sedation options and risks                             were discussed with the patient. All questions were                             answered and informed consent was obtained. Patient                             identification and proposed procedure were verified                             by the physician in the pre-procedure area. Mental                             Status Examination: alert and oriented. Airway                             Examination: normal oropharyngeal airway and neck                             mobility. Respiratory Examination: clear to                             auscultation. CV Examination: normal. Prophylactic                               Antibiotics: The patient does not require                             prophylactic antibiotics. Prior Anticoagulants: The                             patient has taken no anticoagulant or antiplatelet                             agents except for aspirin. ASA Grade Assessment: II                             - A patient with mild systemic disease. After                             reviewing the risks and benefits, the patient was                             deemed in satisfactory condition to undergo the                             procedure. The anesthesia plan was to use moderate                             sedation / analgesia (conscious sedation). This                             assessment was completed at 09:40 AM, before the                             administration of sedation.                            After obtaining informed consent, the endoscope was                             passed under direct vision. Throughout the                             procedure,  the patient's blood pressure, pulse, and                             oxygen saturations were monitored continuously. The                             was introduced through the mouth, and advanced to                             the third part of duodenum. The upper GI endoscopy                             was accomplished without difficulty. The patient                             tolerated the procedure well.                                                                                   Findings:       The oropharynx was normal.       No gross lesions were noted in the entire esophagus.       The Z-line was regular and was found 35 cm from the incisors.       The gastroesophageal flap valve was visualized endoscopically and        classified as Hill Grade I (prominent fold, tight to endoscope).       A few localized 1 to 2 mm erosions with no bleeding and no stigmata of        recent bleeding were found in the gastric antrum. Biopsies were taken         with a cold forceps for histo logy.       No gross lesions were noted in the entire examined duodenum.                                                                                   Moderate Sedation:       Moderate (conscious) sedation was administered by the endoscopy nurse        and supervised by the endoscopist. The patient's oxygen saturation,        heart rate, blood pressure and response to care were monitored. Total        physician intraservice time was 9 minutes.       An independent trained observer was present and continuously monitored        the patient.  Impression:               - Normal oropharynx.                            - No gross lesions in the entire esophagus.                            - Z-line regular, 35 cm from the incisors.                            - Gastroesophageal flap valve classified as Hill                             Grade I (prominent fold, tight to endoscope).                            - Erosive gastropathy with no bleeding and no                             stigmata of  recent bleeding. Biopsied.                            - No gross lesions in the entire examined duodenum.  Recommendation:           - Patient has a contact number available for                             emergencies. The signs and symptoms of potential                             delayed complications were discussed with the                             patient. Return to normal activities tomorrow.                             Written discharge instructions were provided to the                             patient.                            - Resume previous diet.                            - Continue present medications.                            - Await pathology results.                            - Return to primary care physician PRN.                                                                                     Zion Calles MD  ______________________  Zion Calles,    9/6/2022 9:59:25 AM  I was physically present for the entire viewing portion of the exam.Zion Calles  Number of Addenda: 0    Note Initiated On: 9/6/2022 9:35 AM  Scope In:  Scope Out:         If you have any questions please call the clinic at [phone]    Sincerely,      PURA Hurst, FNP-BC     bmd

## 2022-09-06 NOTE — RESULT ENCOUNTER NOTE
Please send letter    Moe Flanagan,    Thank you for your recent office visit.    Here are your recent results.  Per GI-  Impression:               - Normal oropharynx.                             - No gross lesions in the entire esophagus.                             - Z-line regular, 35 cm from the incisors.                             - Gastroesophageal flap valve classified as Hill                             Grade I (prominent fold, tight to endoscope).                             - Erosive gastropathy with no bleeding and no                             stigmata of recent bleeding. Biopsied.                             - No gross lesions in the entire examined duodenum.   Recommendation:           - Patient has a contact number available for                             emergencies. The signs and symptoms of potential                             delayed complications were discussed with the                             patient. Return to normal activities tomorrow.                             Written discharge instructions were provided to the                             patient.                             - Resume previous diet.                             - Continue present medications.                             - Await pathology results.                             - Return to primary care physician PRN.                                                                              Feel free to contact me via "[employer] or call the clinic at [phone].    Sincerely,    Alma Linder, PURA, FNP-BC

## 2022-09-08 ENCOUNTER — TELEPHONE (OUTPATIENT)
Dept: SURGERY | Facility: CLINIC | Age: 67
End: 2022-09-08

## 2022-09-08 LAB
PATH REPORT.COMMENTS IMP SPEC: NORMAL
PATH REPORT.FINAL DX SPEC: NORMAL
PATH REPORT.GROSS SPEC: NORMAL
PATH REPORT.MICROSCOPIC SPEC OTHER STN: NORMAL
PATH REPORT.RELEVANT HX SPEC: NORMAL
PHOTO IMAGE: NORMAL

## 2022-09-08 PROCEDURE — 88305 TISSUE EXAM BY PATHOLOGIST: CPT | Performed by: PATHOLOGY

## 2022-09-08 NOTE — TELEPHONE ENCOUNTER
[facility] Call Center    Phone Message    May a detailed message be left on voicemail: yes     Reason for Call: Other: Patient returned call.  Please try her back again.      Action Taken: Message routed to:  [facility] GI    Travel Screening: Not Applicable

## 2022-09-08 NOTE — TELEPHONE ENCOUNTER
----- Message from Zion Calles MD sent at 9/8/2022 11:41 AM CDT -----  Stomach biopsies confirmed some erosions and inflammation. No H.pylori bacteria or other abnormality seen. Recommend continuing on the omeprazole and follow up with PCP for any additional workup

## 2023-02-09 NOTE — PATIENT INSTRUCTIONS
Please make appointment to see eye Dr.     Please bring in your living will and healthcare directive.     Stop taking the omeprazole. Start taking the protonix when you get it. Please be aware that long term use of these medications does decrease your absorption of calcium.       Patient Education   Personalized Prevention Plan  You are due for the preventive services outlined below.  Your care team is available to assist you in scheduling these services.  If you have already completed any of these items, please share that information with your care team to update in your medical record.  Health Maintenance Due   Topic Date Due    Pneumococcal Vaccine (2 - PCV) 02/25/2021    Flu Vaccine (1) 09/01/2022    COVID-19 Vaccine (5 - Booster for Pfizer series) 09/23/2022    PHQ-2 (once per calendar year)  01/01/2023    Basic Metabolic Panel  01/18/2023    Cholesterol Lab  01/18/2023    ANNUAL REVIEW OF HM ORDERS  01/18/2023    FALL RISK ASSESSMENT  01/18/2023    Mammogram  01/29/2023     Preventive Health Recommendations    See your health care provider every year to  Review health changes.   Discuss preventive care.    Review your medicines if your doctor has prescribed any.  You no longer need a yearly Pap test unless you've had an abnormal Pap test in the past 10 years. If you have vaginal symptoms, such as bleeding or discharge, be sure to talk with your provider about a Pap test.  Every 1 to 2 years, have a mammogram.  If you are over 69, talk with your health care provider about whether or not you want to continue having screening mammograms.  Every 10 years, have a colonoscopy. Or, have a yearly FIT test (stool test). These exams will check for colon cancer.   Have a cholesterol test every 5 years, or more often if your doctor advises it.   Have a diabetes test (fasting glucose) every three years. If you are at risk for diabetes, you should have this test more often.   At age 65, have a bone density scan (DEXA) to  check for osteoporosis (brittle bone disease).    Shots:  Get a flu shot each year.  Get a tetanus shot every 10 years.  Talk to your doctor about your pneumonia vaccines. There are now two you should receive - Pneumovax (PPSV 23) and Prevnar (PCV 13).  Talk to your pharmacist about the shingles vaccine.  Talk to your doctor about the hepatitis B vaccine.    Nutrition:   Eat at least 5 servings of fruits and vegetables each day.  Eat whole-grain bread, whole-wheat pasta and brown rice instead of white grains and rice.  Get adequate Calcium and Vitamin D.     Lifestyle  Exercise at least 150 minutes a week (30 minutes a day, 5 days a week). This will help you control your weight and prevent disease.  Limit alcohol to one drink per day.  No smoking.   Wear sunscreen to prevent skin cancer.   See your dentist twice a year for an exam and cleaning.  See your eye doctor every 1 to 2 years to screen for conditions such as glaucoma, macular degeneration and cataracts.    Personalized Prevention Plan  You are due for the preventive services outlined below.  Your care team is available to assist you in scheduling these services.  If you have already completed any of these items, please share that information with your care team to update in your medical record.  Health Maintenance   Topic Date Due    Pneumococcal Vaccine: 65+ Years (2 - PCV) 02/25/2021    INFLUENZA VACCINE (1) 09/01/2022    COVID-19 Vaccine (5 - Booster for Pfizer series) 09/23/2022    PHQ-2 (once per calendar year)  01/01/2023    BMP  01/18/2023    LIPID  01/18/2023    ANNUAL REVIEW OF HM ORDERS  01/18/2023    FALL RISK ASSESSMENT  01/18/2023    MAMMO SCREENING  01/29/2023    MEDICARE ANNUAL WELLNESS VISIT  02/13/2024    COLORECTAL CANCER SCREENING  01/19/2025    ADVANCE CARE PLANNING  01/19/2027    DTAP/TDAP/TD IMMUNIZATION (4 - Td or Tdap) 09/16/2029    DEXA  03/04/2037    HEPATITIS C SCREENING  Completed    ZOSTER IMMUNIZATION  Completed    IPV  IMMUNIZATION  Aged Out    MENINGITIS IMMUNIZATION  Aged Out

## 2023-02-09 NOTE — PROGRESS NOTES
"SUBJECTIVE:   Masha is a 67 year old who presents for Preventive Visit.  Patient has been advised of split billing requirements and indicates understanding: Yes  Are you in the first 12 months of your Medicare coverage?  No    Healthy Habits:     In general, how would you rate your overall health?  Good    Frequency of exercise:  1 day/week    Duration of exercise:  N/A    Do you usually eat at least 4 servings of fruit and vegetables a day, include whole grains    & fiber and avoid regularly eating high fat or \"junk\" foods?  No    Taking medications regularly:  Yes    Medication side effects:  None    Ability to successfully perform activities of daily living:  No assistance needed    Home Safety:  No safety concerns identified    Hearing Impairment:  No hearing concerns    In the past 6 months, have you been bothered by leaking of urine?  No    In general, how would you rate your overall mental or emotional health?  Good      PHQ-2 Total Score: 0    Additional concerns today:  No       Have you ever done Advance Care Planning? (For example, a Health Directive, POLST, or a discussion with a medical provider or your loved ones about your wishes): Yes, advance care planning is on file.    Fall risk  Fallen 2 or more times in the past year?: No  Any fall with injury in the past year?: Yes    Cognitive Screening   *No concerns    Reviewed and updated as needed this visit by clinical staff   Tobacco  Allergies  Meds  Problems          Indira Ernandez CMA    Reviewed and updated as needed this visit by Provider      Problems            Social History     Tobacco Use     Smoking status: Former     Types: Cigarettes     Start date: 1973     Quit date: 1983     Years since quittin.9     Smokeless tobacco: Never   Substance Use Topics     Alcohol use: No     If you drink alcohol do you typically have >3 drinks per day or >7 drinks per week? No    Alcohol Use 2023   Prescreen: >3 drinks/day or >7 " drinks/week? Not Applicable   Prescreen: >3 drinks/day or >7 drinks/week? -   No flowsheet data found.      Current providers sharing in care for this patient include:   Patient Care Team:  Jim Santoyo MD as PCP - General (Internal Medicine - Pediatrics)  Greg Adams MD as Assigned Musculoskeletal Provider  Jim Santoyo MD as Assigned PCP    The following health maintenance items are reviewed in Epic and correct as of today:  Health Maintenance   Topic Date Due     Pneumococcal Vaccine: 65+ Years (2 - PCV) 02/25/2021     MAMMO SCREENING  01/29/2023     MEDICARE ANNUAL WELLNESS VISIT  02/13/2024     BMP  02/13/2024     LIPID  02/13/2024     ANNUAL REVIEW OF HM ORDERS  02/13/2024     FALL RISK ASSESSMENT  02/13/2024     COLORECTAL CANCER SCREENING  01/19/2025     ADVANCE CARE PLANNING  02/13/2028     DTAP/TDAP/TD IMMUNIZATION (4 - Td or Tdap) 09/16/2029     DEXA  03/04/2037     HEPATITIS C SCREENING  Completed     PHQ-2 (once per calendar year)  Completed     INFLUENZA VACCINE  Completed     ZOSTER IMMUNIZATION  Completed     COVID-19 Vaccine  Completed     IPV IMMUNIZATION  Aged Out     MENINGITIS IMMUNIZATION  Aged Out     BP Readings from Last 3 Encounters:   02/13/23 96/68   09/06/22 90/67   07/29/22 129/83    Wt Readings from Last 3 Encounters:   02/13/23 83.9 kg (185 lb)   09/02/22 83 kg (183 lb)   07/29/22 83.4 kg (183 lb 12.8 oz)              Breast CA Risk Assessment (FHS-7) 1/18/2022   Do you have a family history of breast, colon, or ovarian cancer? No / Unknown     Mammogram Screening: Recommended mammography every 1-2 years with patient discussion and risk factor consideration  Pertinent mammograms are reviewed under the imaging tab.    Here with  for physical and medication refill.  Does have a living will and healthcare directive.  Has copies at home.  Plans to bring into clinic.  Is not fasting.    History of right femur fracture in 2010.  Has osteoporosis.  Left femur  fracture approximately 1 year ago secondary to bisphosphonate-Fosamax.  Currently taking over-the-counter calcium and vitamin D.  Does not smoke.  Tries to be active.    Has reflux.  Takes omeprazle daily, 40 mg.  Will still have heartburn/reflux.  Taking additional over-the-counter Tums.  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  Was recommended to continue omeprazole definitely.  Does not take any over-the-counter NSAIDs.    History of eczema.  Does have some areas currently to right forearm and hand.  Has been applying over-the-counter hydrocortisone cream.  Takes antihistamine occasionally.  Has previously had prescription for triamcinolone cream which worked much better.    Last Pap: Hyst due to heavy bleeding-cyst to left ovary, endometriosis   Last mammogram: 1/21-normal, no family history   Last BMD: 3/22  Last Colonoscopy: 2015-normal. No family history   Last eye exam: 3-4 years ago  Last dental exam: every 6 mo-full upper denture and partial on the bottom  Last tetanus vaccine: 9/19  Last influenza vaccine: Plans to get today  Last shingles vaccine: has had both doses   Last pneumonia vaccine: 2/20-prevnar 23.   Last COVID vaccine: has had both doses   Last COVID booster: Plans to get here today  Hep C screen: Done 2019  HIV screen: Not applicable-low risk  AAA screen (age 65-78 with smoking hx): Not applicable  IVD (HTN, Hyperlipid, Smoking):  Lung CA screening (50-77, 20 pk smoking hx) Quit within 15 years: Quit in 1983-only smoked about 3 cig per day      Review of Systems   Constitutional: Negative for chills and fever.   HENT: Negative for congestion, ear pain, hearing loss and sore throat.    Eyes: Negative for pain and visual disturbance.   Respiratory: Negative for cough and shortness of breath.    Cardiovascular: Negative for chest pain, palpitations and peripheral edema.   Gastrointestinal: Negative for abdominal pain, constipation, diarrhea, heartburn, hematochezia  "and nausea.   Breasts:  Negative for tenderness, breast mass and discharge.   Genitourinary: Negative for dysuria, frequency, genital sores, hematuria, pelvic pain, urgency, vaginal bleeding and vaginal discharge.   Musculoskeletal: Negative for arthralgias, joint swelling and myalgias.   Skin: Positive for rash.   Neurological: Negative for dizziness, weakness, headaches and paresthesias.   Psychiatric/Behavioral: Negative for mood changes. The patient is not nervous/anxious.          OBJECTIVE:   BP 96/68   Pulse 70   Temp 99.3  F (37.4  C) (Tympanic)   Resp 15   Ht 1.626 m (5' 4\")   Wt 83.9 kg (185 lb)   SpO2 95%   BMI 31.76 kg/m   Estimated body mass index is 31.76 kg/m  as calculated from the following:    Height as of this encounter: 1.626 m (5' 4\").    Weight as of this encounter: 83.9 kg (185 lb).  Physical Exam  Constitutional:       Appearance: Normal appearance. She is well-developed.   HENT:      Head: Normocephalic and atraumatic.      Right Ear: Tympanic membrane and external ear normal. No middle ear effusion.      Left Ear: Tympanic membrane and external ear normal.  No middle ear effusion.      Nose: No mucosal edema.   Neck:      Thyroid: No thyromegaly.      Vascular: No carotid bruit.   Cardiovascular:      Rate and Rhythm: Normal rate and regular rhythm.      Heart sounds: Normal heart sounds.   Pulmonary:      Effort: Pulmonary effort is normal.      Breath sounds: Normal breath sounds.   Abdominal:      General: Bowel sounds are normal.      Palpations: Abdomen is soft.   Skin:     General: Skin is warm and dry.          Neurological:      Mental Status: She is alert.   Psychiatric:         Behavior: Behavior normal.         ASSESSMENT / PLAN:   Encounter for Medicare annual wellness exam  Screening guidelines reviewed.   Encouraged to bring in copy of POLST and healthcare directive  Encouraged to go to pharmacy for pneumonia vaccine.    Essential hypertension  Stable-blood pressure " well controlled today in clinic.  Will update labs.  Refill sent.  Follow-up in 1 year  - Albumin Random Urine Quantitative with Creat Ratio; Future  - CBC with Platelets & Differential; Future  - Comprehensive metabolic panel; Future  - hydrochlorothiazide (HYDRODIURIL) 25 MG tablet; Take 1 tablet (25 mg) by mouth daily  - aspirin (ASA) 325 MG EC tablet; Take 1 tablet (325 mg) by mouth daily  - Albumin Random Urine Quantitative with Creat Ratio  - CBC with Platelets & Differential  - Comprehensive metabolic panel    Mixed hyperlipidemia  Tolerating atorvastatin well.  We will update labs.  Refill given.  Follow-up in 1 year  - Comprehensive metabolic panel; Future  - Lipid panel reflex to direct LDL Fasting; Future  - atorvastatin (LIPITOR) 20 MG tablet; Take 1 tablet (20 mg) by mouth daily  - Comprehensive metabolic panel  - Lipid panel reflex to direct LDL Fasting    Visit for screening mammogram  Scheduled prior to leaving clinic  - MA SCREENING DIGITAL BILAT - Future  (s+30); Future    Eczema, unspecified type  Warm baths/showers, not hot baths/showers  Use thick moisturizing cream in a jar like Aquaphilic, Cetaphil, or Eucerin   Make sure to keep skin dry   Educated on use of cream.  Encouraged to not overuse.  - triamcinolone (KENALOG) 0.1 % external cream; Apply to affected area twice per day as needed.    Rib pain on right side  Refill given to have available when needed  - cyclobenzaprine (FLEXERIL) 5 MG tablet; Take 1 tablet (5 mg) by mouth 2 times daily as needed for muscle spasms    Gastroesophageal reflux disease with esophagitis, unspecified whether hemorrhage  Much education given regarding long-term use of PPI and osteoporosis.  Previous upper endoscopy reviewed.  Unfortunately, continues to have significant symptoms on omeprazole.  Discontinue omeprazole and will start Protonix.  To notify if no improvement over the next 4 weeks.  May need to add Pepcid/famotidine.  - pantoprazole (PROTONIX) 40 MG  "EC tablet; Take 1 tablet (40 mg) by mouth daily    Abnormal glucose  - Comprehensive metabolic panel; Future  - Hemoglobin A1c; Future  - Comprehensive metabolic panel  - Hemoglobin A1c    Osteoporosis, unspecified osteoporosis type, unspecified pathological fracture presence  Previous notes reviewed.    Previous imaging reviewed.  Reinforced importance of being active.  Continue calcium supplementation.    Patient has been advised of split billing requirements and indicates understanding: Yes      COUNSELING:  Reviewed preventive health counseling, as reflected in patient instructions       Regular exercise       Healthy diet/nutrition       Vision screening       Dental care       Fall risk prevention       Immunizations    Vaccinated for: Covid-19 and Influenza             Aspirin prophylaxis        Osteoporosis prevention/bone health       Colon cancer screening       Hepatitis C screening       Advanced Planning       BMI:   Estimated body mass index is 31.76 kg/m  as calculated from the following:    Height as of this encounter: 1.626 m (5' 4\").    Weight as of this encounter: 83.9 kg (185 lb).   Weight management plan: Discussed healthy diet and exercise guidelines      She reports that she quit smoking about 39 years ago. Her smoking use included cigarettes. She started smoking about 49 years ago. She has never used smokeless tobacco.      Appropriate preventive services were discussed with this patient, including applicable screening as appropriate for cardiovascular disease, diabetes, osteopenia/osteoporosis, and glaucoma.  As appropriate for age/gender, discussed screening for colorectal cancer, prostate cancer, breast cancer, and cervical cancer. Checklist reviewing preventive services available has been given to the patient.    Reviewed patients plan of care and provided an AVS. The Basic Care Plan (routine screening as documented in Health Maintenance) for Masha meets the Care Plan requirement. This " Care Plan has been established and reviewed with the Patient and spouse.      PURA Mendoza CNP  [facility]    Identified Health Risks:

## 2023-02-13 ENCOUNTER — OFFICE VISIT (OUTPATIENT)
Dept: FAMILY MEDICINE | Facility: CLINIC | Age: 68
End: 2023-02-13
Payer: COMMERCIAL

## 2023-02-13 VITALS
DIASTOLIC BLOOD PRESSURE: 68 MMHG | RESPIRATION RATE: 15 BRPM | HEIGHT: 64 IN | SYSTOLIC BLOOD PRESSURE: 96 MMHG | TEMPERATURE: 99.3 F | HEART RATE: 70 BPM | WEIGHT: 185 LBS | OXYGEN SATURATION: 95 % | BODY MASS INDEX: 31.58 KG/M2

## 2023-02-13 DIAGNOSIS — I10 ESSENTIAL HYPERTENSION: ICD-10-CM

## 2023-02-13 DIAGNOSIS — E78.2 MIXED HYPERLIPIDEMIA: ICD-10-CM

## 2023-02-13 DIAGNOSIS — Z12.31 VISIT FOR SCREENING MAMMOGRAM: ICD-10-CM

## 2023-02-13 DIAGNOSIS — Z00.00 ENCOUNTER FOR MEDICARE ANNUAL WELLNESS EXAM: Primary | ICD-10-CM

## 2023-02-13 DIAGNOSIS — L30.9 ECZEMA, UNSPECIFIED TYPE: ICD-10-CM

## 2023-02-13 DIAGNOSIS — R07.81 RIB PAIN ON RIGHT SIDE: ICD-10-CM

## 2023-02-13 DIAGNOSIS — M81.0 OSTEOPOROSIS, UNSPECIFIED OSTEOPOROSIS TYPE, UNSPECIFIED PATHOLOGICAL FRACTURE PRESENCE: ICD-10-CM

## 2023-02-13 DIAGNOSIS — K21.00 GASTROESOPHAGEAL REFLUX DISEASE WITH ESOPHAGITIS, UNSPECIFIED WHETHER HEMORRHAGE: ICD-10-CM

## 2023-02-13 DIAGNOSIS — R73.09 ABNORMAL GLUCOSE: ICD-10-CM

## 2023-02-13 LAB
BASOPHILS # BLD AUTO: 0 10E3/UL (ref 0–0.2)
BASOPHILS NFR BLD AUTO: 0 %
EOSINOPHIL # BLD AUTO: 0.3 10E3/UL (ref 0–0.7)
EOSINOPHIL NFR BLD AUTO: 4 %
ERYTHROCYTE [DISTWIDTH] IN BLOOD BY AUTOMATED COUNT: 12.9 % (ref 10–15)
HBA1C MFR BLD: 5.5 % (ref 0–5.6)
HCT VFR BLD AUTO: 41.9 % (ref 35–47)
HGB BLD-MCNC: 13.9 G/DL (ref 11.7–15.7)
LYMPHOCYTES # BLD AUTO: 2.2 10E3/UL (ref 0.8–5.3)
LYMPHOCYTES NFR BLD AUTO: 26 %
MCH RBC QN AUTO: 30.4 PG (ref 26.5–33)
MCHC RBC AUTO-ENTMCNC: 33.2 G/DL (ref 31.5–36.5)
MCV RBC AUTO: 92 FL (ref 78–100)
MONOCYTES # BLD AUTO: 0.6 10E3/UL (ref 0–1.3)
MONOCYTES NFR BLD AUTO: 7 %
NEUTROPHILS # BLD AUTO: 5.3 10E3/UL (ref 1.6–8.3)
NEUTROPHILS NFR BLD AUTO: 63 %
PLATELET # BLD AUTO: 234 10E3/UL (ref 150–450)
RBC # BLD AUTO: 4.57 10E6/UL (ref 3.8–5.2)
WBC # BLD AUTO: 8.5 10E3/UL (ref 4–11)

## 2023-02-13 PROCEDURE — 80053 COMPREHEN METABOLIC PANEL: CPT | Performed by: NURSE PRACTITIONER

## 2023-02-13 PROCEDURE — 85025 COMPLETE CBC W/AUTO DIFF WBC: CPT | Performed by: NURSE PRACTITIONER

## 2023-02-13 PROCEDURE — 36415 COLL VENOUS BLD VENIPUNCTURE: CPT | Performed by: NURSE PRACTITIONER

## 2023-02-13 PROCEDURE — 90662 IIV NO PRSV INCREASED AG IM: CPT | Performed by: NURSE PRACTITIONER

## 2023-02-13 PROCEDURE — 82043 UR ALBUMIN QUANTITATIVE: CPT | Performed by: NURSE PRACTITIONER

## 2023-02-13 PROCEDURE — 80061 LIPID PANEL: CPT | Performed by: NURSE PRACTITIONER

## 2023-02-13 PROCEDURE — 82570 ASSAY OF URINE CREATININE: CPT | Performed by: NURSE PRACTITIONER

## 2023-02-13 PROCEDURE — G0439 PPPS, SUBSEQ VISIT: HCPCS | Performed by: NURSE PRACTITIONER

## 2023-02-13 PROCEDURE — 0124A COVID-19 VACCINE BIVALENT BOOSTER 12+ (PFIZER): CPT | Performed by: NURSE PRACTITIONER

## 2023-02-13 PROCEDURE — 83036 HEMOGLOBIN GLYCOSYLATED A1C: CPT | Performed by: NURSE PRACTITIONER

## 2023-02-13 PROCEDURE — G0008 ADMIN INFLUENZA VIRUS VAC: HCPCS | Performed by: NURSE PRACTITIONER

## 2023-02-13 PROCEDURE — 99214 OFFICE O/P EST MOD 30 MIN: CPT | Mod: 25 | Performed by: NURSE PRACTITIONER

## 2023-02-13 PROCEDURE — 91312 COVID-19 VACCINE BIVALENT BOOSTER 12+ (PFIZER): CPT | Performed by: NURSE PRACTITIONER

## 2023-02-13 RX ORDER — TRIAMCINOLONE ACETONIDE 1 MG/G
CREAM TOPICAL
Qty: 45 G | Refills: 1 | Status: SHIPPED | OUTPATIENT
Start: 2023-02-13

## 2023-02-13 RX ORDER — HYDROCHLOROTHIAZIDE 25 MG/1
25 TABLET ORAL DAILY
Qty: 90 TABLET | Refills: 3 | Status: SHIPPED | OUTPATIENT
Start: 2023-02-13 | End: 2024-01-09

## 2023-02-13 RX ORDER — PANTOPRAZOLE SODIUM 40 MG/1
40 TABLET, DELAYED RELEASE ORAL DAILY
Qty: 90 TABLET | Refills: 3 | Status: SHIPPED | OUTPATIENT
Start: 2023-02-13 | End: 2023-10-17

## 2023-02-13 RX ORDER — ATORVASTATIN CALCIUM 20 MG/1
20 TABLET, FILM COATED ORAL DAILY
Qty: 90 TABLET | Refills: 3 | Status: SHIPPED | OUTPATIENT
Start: 2023-02-13 | End: 2024-01-09

## 2023-02-13 RX ORDER — CYCLOBENZAPRINE HCL 5 MG
5 TABLET ORAL 2 TIMES DAILY PRN
Qty: 30 TABLET | Refills: 0
Start: 2023-02-13 | End: 2024-01-09

## 2023-02-13 ASSESSMENT — ENCOUNTER SYMPTOMS
FREQUENCY: 0
WEAKNESS: 0
HEMATOCHEZIA: 0
MYALGIAS: 0
DIARRHEA: 0
JOINT SWELLING: 0
FEVER: 0
CONSTIPATION: 0
ARTHRALGIAS: 0
BREAST MASS: 0
PALPITATIONS: 0
DIZZINESS: 0
NAUSEA: 0
COUGH: 0
EYE PAIN: 0
HEMATURIA: 0
PARESTHESIAS: 0
NERVOUS/ANXIOUS: 0
SORE THROAT: 0
HEADACHES: 0
CHILLS: 0
HEARTBURN: 0
DYSURIA: 0
ABDOMINAL PAIN: 0
SHORTNESS OF BREATH: 0

## 2023-02-13 ASSESSMENT — ACTIVITIES OF DAILY LIVING (ADL): CURRENT_FUNCTION: NO ASSISTANCE NEEDED

## 2023-02-13 ASSESSMENT — PAIN SCALES - GENERAL: PAINLEVEL: NO PAIN (0)

## 2023-02-13 NOTE — LETTER
February 14, 2023      Masha Alegriaserman  [address] [place] [id number]        Dear ,    We are writing to inform you of your test results.    Your hemoglobin A1c, 3-month average of your blood sugar, is in normal range.     Your blood counts are in normal range.    Resulted Orders   Hemoglobin A1c   Result Value Ref Range    Hemoglobin A1C 5.5 0.0 - 5.6 %      Comment:      Normal <5.7%   Prediabetes 5.7-6.4%    Diabetes 6.5% or higher     Note: Adopted from ADA consensus guidelines.   CBC with platelets and differential   Result Value Ref Range    WBC Count 8.5 4.0 - 11.0 10e3/uL    RBC Count 4.57 3.80 - 5.20 10e6/uL    Hemoglobin 13.9 11.7 - 15.7 g/dL    Hematocrit 41.9 35.0 - 47.0 %    MCV 92 78 - 100 fL    MCH 30.4 26.5 - 33.0 pg    MCHC 33.2 31.5 - 36.5 g/dL    RDW 12.9 10.0 - 15.0 %    Platelet Count 234 150 - 450 10e3/uL    % Neutrophils 63 %    % Lymphocytes 26 %    % Monocytes 7 %    % Eosinophils 4 %    % Basophils 0 %    Absolute Neutrophils 5.3 1.6 - 8.3 10e3/uL    Absolute Lymphocytes 2.2 0.8 - 5.3 10e3/uL    Absolute Monocytes 0.6 0.0 - 1.3 10e3/uL    Absolute Eosinophils 0.3 0.0 - 0.7 10e3/uL    Absolute Basophils 0.0 0.0 - 0.2 10e3/uL       If you have any questions or concerns, please call the clinic at the number listed above.       Sincerely,      Neyda Vega, APRN CNP/mp

## 2023-02-14 LAB
ALBUMIN SERPL-MCNC: 3.8 G/DL (ref 3.4–5)
ALP SERPL-CCNC: 87 U/L (ref 40–150)
ALT SERPL W P-5'-P-CCNC: 31 U/L (ref 0–50)
ANION GAP SERPL CALCULATED.3IONS-SCNC: 6 MMOL/L (ref 3–14)
AST SERPL W P-5'-P-CCNC: 20 U/L (ref 0–45)
BILIRUB SERPL-MCNC: 0.5 MG/DL (ref 0.2–1.3)
BUN SERPL-MCNC: 20 MG/DL (ref 7–30)
CALCIUM SERPL-MCNC: 9.3 MG/DL (ref 8.5–10.1)
CHLORIDE BLD-SCNC: 103 MMOL/L (ref 94–109)
CHOLEST SERPL-MCNC: 194 MG/DL
CO2 SERPL-SCNC: 30 MMOL/L (ref 20–32)
CREAT SERPL-MCNC: 0.72 MG/DL (ref 0.52–1.04)
CREAT UR-MCNC: 43 MG/DL
FASTING STATUS PATIENT QL REPORTED: NO
GFR SERPL CREATININE-BSD FRML MDRD: >90 ML/MIN/1.73M2
GLUCOSE BLD-MCNC: 98 MG/DL (ref 70–99)
HDLC SERPL-MCNC: 57 MG/DL
LDLC SERPL CALC-MCNC: 91 MG/DL
MICROALBUMIN UR-MCNC: <5 MG/L
MICROALBUMIN/CREAT UR: NORMAL MG/G{CREAT}
NONHDLC SERPL-MCNC: 137 MG/DL
POTASSIUM BLD-SCNC: 3.7 MMOL/L (ref 3.4–5.3)
PROT SERPL-MCNC: 6.9 G/DL (ref 6.8–8.8)
SODIUM SERPL-SCNC: 139 MMOL/L (ref 133–144)
TRIGL SERPL-MCNC: 229 MG/DL

## 2023-03-10 ENCOUNTER — ANCILLARY PROCEDURE (OUTPATIENT)
Dept: MAMMOGRAPHY | Facility: CLINIC | Age: 68
End: 2023-03-10
Attending: NURSE PRACTITIONER
Payer: COMMERCIAL

## 2023-03-10 DIAGNOSIS — Z12.31 VISIT FOR SCREENING MAMMOGRAM: ICD-10-CM

## 2023-03-10 PROCEDURE — 77067 SCR MAMMO BI INCL CAD: CPT | Mod: TC | Performed by: RADIOLOGY

## 2023-07-07 ENCOUNTER — OFFICE VISIT (OUTPATIENT)
Dept: URGENT CARE | Facility: URGENT CARE | Age: 68
End: 2023-07-07
Payer: COMMERCIAL

## 2023-07-07 ENCOUNTER — NURSE TRIAGE (OUTPATIENT)
Dept: NURSING | Facility: CLINIC | Age: 68
End: 2023-07-07
Payer: COMMERCIAL

## 2023-07-07 VITALS
OXYGEN SATURATION: 98 % | WEIGHT: 185.8 LBS | RESPIRATION RATE: 16 BRPM | DIASTOLIC BLOOD PRESSURE: 82 MMHG | TEMPERATURE: 97 F | BODY MASS INDEX: 31.89 KG/M2 | HEART RATE: 51 BPM | SYSTOLIC BLOOD PRESSURE: 137 MMHG

## 2023-07-07 DIAGNOSIS — R42 DIZZINESS: Primary | ICD-10-CM

## 2023-07-07 DIAGNOSIS — I10 HYPERTENSION, UNSPECIFIED TYPE: ICD-10-CM

## 2023-07-07 DIAGNOSIS — R00.1 BRADYCARDIA: ICD-10-CM

## 2023-07-07 DIAGNOSIS — R11.0 NAUSEA: ICD-10-CM

## 2023-07-07 DIAGNOSIS — Z87.820 H/O TRAUMATIC BRAIN INJURY: ICD-10-CM

## 2023-07-07 DIAGNOSIS — K21.9 GASTROESOPHAGEAL REFLUX DISEASE, UNSPECIFIED WHETHER ESOPHAGITIS PRESENT: ICD-10-CM

## 2023-07-07 DIAGNOSIS — E78.5 HYPERLIPIDEMIA, UNSPECIFIED HYPERLIPIDEMIA TYPE: ICD-10-CM

## 2023-07-07 DIAGNOSIS — H61.23 BILATERAL IMPACTED CERUMEN: ICD-10-CM

## 2023-07-07 PROCEDURE — 69209 REMOVE IMPACTED EAR WAX UNI: CPT | Mod: 50 | Performed by: PHYSICIAN ASSISTANT

## 2023-07-07 PROCEDURE — 93000 ELECTROCARDIOGRAM COMPLETE: CPT | Mod: 59 | Performed by: PHYSICIAN ASSISTANT

## 2023-07-07 PROCEDURE — 99214 OFFICE O/P EST MOD 30 MIN: CPT | Mod: 25 | Performed by: PHYSICIAN ASSISTANT
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
## 2023-07-07 NOTE — TELEPHONE ENCOUNTER
Patient returning call and informed she is planning on going to urgent care, as she does not want to delay care any further.    TRI Aguilar RN  [facility]
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
## 2023-07-07 NOTE — PATIENT INSTRUCTIONS
Symptomatic bradycardia - severe dizziness. Limited on labs and imaging. To ER for further evaluation and treatment.  EKG rate 45

## 2023-07-11 ENCOUNTER — OFFICE VISIT (OUTPATIENT)
Dept: FAMILY MEDICINE | Facility: CLINIC | Age: 68
End: 2023-07-11
Payer: COMMERCIAL

## 2023-07-11 ENCOUNTER — TELEPHONE (OUTPATIENT)
Dept: FAMILY MEDICINE | Facility: CLINIC | Age: 68
End: 2023-07-11

## 2023-07-11 VITALS
HEART RATE: 64 BPM | DIASTOLIC BLOOD PRESSURE: 81 MMHG | OXYGEN SATURATION: 98 % | SYSTOLIC BLOOD PRESSURE: 119 MMHG | TEMPERATURE: 98.2 F | WEIGHT: 185.8 LBS | BODY MASS INDEX: 31.89 KG/M2

## 2023-07-11 DIAGNOSIS — H81.10 BENIGN PAROXYSMAL POSITIONAL VERTIGO, UNSPECIFIED LATERALITY: Primary | ICD-10-CM

## 2023-07-11 DIAGNOSIS — E78.5 HYPERLIPIDEMIA, UNSPECIFIED HYPERLIPIDEMIA TYPE: ICD-10-CM

## 2023-07-11 DIAGNOSIS — R07.9 CHEST PAIN, UNSPECIFIED TYPE: ICD-10-CM

## 2023-07-11 DIAGNOSIS — I10 ESSENTIAL HYPERTENSION: ICD-10-CM

## 2023-07-11 PROBLEM — E66.01 MORBID OBESITY (H): Status: RESOLVED | Noted: 2021-01-26 | Resolved: 2023-07-11

## 2023-07-11 PROCEDURE — 99214 OFFICE O/P EST MOD 30 MIN: CPT | Performed by: FAMILY MEDICINE

## 2023-07-11 RX ORDER — MECLIZINE HYDROCHLORIDE 25 MG/1
25 TABLET ORAL 3 TIMES DAILY PRN
Qty: 25 TABLET | Refills: 0 | Status: SHIPPED | OUTPATIENT
Start: 2023-07-11 | End: 2023-07-21

## 2023-07-11 ASSESSMENT — PAIN SCALES - GENERAL: PAINLEVEL: NO PAIN (0)

## 2023-07-11 NOTE — TELEPHONE ENCOUNTER
Patient went over details of Urgent care and ER visit on 7/7/23. Reports she was told she was going to get a motion sickness patch for behind her ear but she did not have this prescribed. She is scheduled for at [place] 7/19/23 for a CT scan of her heart (details are noted in ER visit in [facility]).    She reports ongoing dizzy spells. She can get these randomly. She does not have triggers. She has been using a motion sickness OTC medication. This provides mild relief.     Denies chest pain and SOB.    She is wanting to be seen. PCP has no open appointments. Patient was scheduled with Dr. Oliver today at 12:40 am.     RN encouraged ER with worsening symptoms or chest pain. She verbalized understanding     Taylor Cortez RN on 7/11/2023 at 9:35 AM

## 2023-07-11 NOTE — PROGRESS NOTES
Assessment & Plan     Benign paroxysmal positional vertigo, unspecified laterality  Getting better slowly   Advised get upp slowly  Pt is not driving  Follow up PCP 2 weeks if not better/sooner if worse  - meclizine (ANTIVERT) 25 MG tablet; Take 1 tablet (25 mg) by mouth 3 times daily as needed for dizziness  - Physical Therapy Referral; Future    Chest pain, unspecified type  Has CT scheduled  Advised to call and get results as It is Going to be done at [facility]    Hyperlipidemia, unspecified hyperlipidemia type  Stable     Essential hypertension  Stable        MED REC REQUIRED  Post Medication Reconciliation Status: discharge medications reconciled, continue medications without change    Follow up if any further symptoms or no Improvement  Ladi Oliver MD  [facility] J[facility] Flanagan is a 67 year old, presenting for the following health issues:  ER F/U        7/11/2023    12:47 PM   Additional Questions   Roomed by Lisa Flanagan JAVIER Chery is a 67 y.o. female with a past medical history of hypertension and hyperlipidemia and a family history of cardiac problems that onset around her age presenting to the emergency department for evaluation of chest pain and dizziness.    History of Present Illness       Reason for visit:  Followup from urgent care and ER Friday    She eats 0-1 servings of fruits and vegetables daily.She consumes 1 sweetened beverage(s) daily.She exercises with enough effort to increase her heart rate 9 or less minutes per day.  She exercises with enough effort to increase her heart rate 3 or less days per week.   She is taking medications regularly.       ED/UC Followup:    Facility: [facility] and then went to [facility]    Date of visit: July 07, 2023  Reason for visit: Dizziness, chest pain Nausea, mitchel cardia.   Current Status: Still a little light head,   More with movement  Chest pain  better  Pt has a coronary CT scan scheduled   Hospital and urgent care notes reviewed       Review of Systems   CONSTITUTIONAL: NEGATIVE for fever, chills, change in weight  ENT/MOUTH: NEGATIVE for ear, mouth and throat problems  RESP: NEGATIVE for significant cough or SOB  CV: NEGATIVE for chest pain, palpitations or peripheral edema  GI: NEGATIVE for nausea, abdominal pain, heartburn, or change in bowel habits  MUSCULOSKELETAL: NEGATIVE for significant arthralgias or myalgia  PSYCHIATRIC: NEGATIVE for changes in mood or affect      Objective    /81   Pulse 64   Temp 98.2  F (36.8  C) (Temporal)   Wt 84.3 kg (185 lb 12.8 oz)   SpO2 98%   BMI 31.89 kg/m    Body mass index is 31.89 kg/m .  Physical Exam   GENERAL: healthy, alert and no distress  NECK: no adenopathy, no asymmetry, masses, or scars and thyroid normal to palpation  RESP: lungs clear to auscultation - no rales, rhonchi or wheezes  CV: regular rate and rhythm, normal S1 S2, no S3 or S4, no murmur, click or rub, no peripheral edema and peripheral pulses strong  ABDOMEN: soft, nontender, no hepatosplenomegaly, no masses and bowel sounds normal  MS: no gross musculoskeletal defects noted, no edema  SKIN: no suspicious lesions or rashes  NEURO: Normal strength and tone, sensory exam grossly normal, mentation intact, speech normal, cranial nerves 2-12 intact, DTR's normal and symmetric normal  and Romberg normal  PSYCH: mentation appears normal, affect normal/bright    Labs reviewed                12-Apr-2022 04:33

## 2023-07-20 ENCOUNTER — TELEPHONE (OUTPATIENT)
Dept: FAMILY MEDICINE | Facility: CLINIC | Age: 68
End: 2023-07-20
Payer: COMMERCIAL

## 2023-07-20 DIAGNOSIS — H81.10 BENIGN PAROXYSMAL POSITIONAL VERTIGO, UNSPECIFIED LATERALITY: ICD-10-CM

## 2023-07-20 NOTE — TELEPHONE ENCOUNTER
Patient is requesting PCP to review her CT that was done at [facility]     Patient is wanting PCP recommendation on if she needs a follow up appointment - appears scan was normal - patient states her chest pain is still off and on but not sure if she may just be anxious     Saw Benito on 7/11/23 - prescribed meclizine - wondering what she should do for refills - has PT appointment 8/3/23 and 8/21/23 - dizziness is still present; even with the prescription.     Please advise.     Alma Owen RN  [facility]

## 2023-07-21 RX ORDER — MECLIZINE HYDROCHLORIDE 25 MG/1
25 TABLET ORAL 3 TIMES DAILY PRN
Qty: 25 TABLET | Refills: 4 | Status: SHIPPED | OUTPATIENT
Start: 2023-07-21 | End: 2024-01-09

## 2023-07-22 ENCOUNTER — NURSE TRIAGE (OUTPATIENT)
Dept: NURSING | Facility: CLINIC | Age: 68
End: 2023-07-22
Payer: COMMERCIAL

## 2023-07-23 NOTE — TELEPHONE ENCOUNTER
Patient hit leg today with a table on her shin bone.     Hematoma at site 4 inches around and stable. No deformity dislocation break In skin or external bleeding. No blood thinners. Can walk without difficulty.     She applied ice to site, I recommend she take otc tylenol and put ace bandage or compression sock over area. Watch distal foot color and sensation remove if any changes.     [place] [facility] [place] with any worsening symptoms.     Reason for Disposition    Large swelling or bruise > 2 inches (5 cm)    Additional Information    Negative: Can't stand (bear weight) or walk    Negative: Serious injury with multiple fractures (broken bones)    Negative: [1] Major bleeding (e.g., actively dripping or spurting) AND [2] can't be stopped    Negative: Bullet wound, stabbed by knife, or other serious penetrating wound    Negative: Looks like a dislocated joint (crooked or deformed)    Negative: Sounds like a life-threatening emergency to the triager    Negative: Skin is split open or gaping (or length > 1/2 inch or 12 mm)    Negative: [1] Bleeding AND [2] won't stop after 10 minutes of direct pressure (using correct technique)    Negative: [1] Dirt in the wound AND [2] not removed with 15 minutes of scrubbing    Negative: Sounds like a serious injury to the triager    Negative: [1] SEVERE pain (e.g., excruciating pain, unable to do any normal activities)  AND [2] not improved 2 hours after pain medicine/ice packs    Negative: Suspicious history for the injury    Protocols used: LEG INJURY-A-

## 2023-07-24 ENCOUNTER — ANCILLARY PROCEDURE (OUTPATIENT)
Dept: GENERAL RADIOLOGY | Facility: CLINIC | Age: 68
End: 2023-07-24
Attending: PEDIATRICS
Payer: COMMERCIAL

## 2023-07-24 ENCOUNTER — OFFICE VISIT (OUTPATIENT)
Dept: ORTHOPEDICS | Facility: CLINIC | Age: 68
End: 2023-07-24
Payer: COMMERCIAL

## 2023-07-24 VITALS
BODY MASS INDEX: 31.76 KG/M2 | HEART RATE: 75 BPM | SYSTOLIC BLOOD PRESSURE: 114 MMHG | WEIGHT: 185 LBS | DIASTOLIC BLOOD PRESSURE: 77 MMHG

## 2023-07-24 DIAGNOSIS — S89.91XA INJURY OF RIGHT LOWER LEG, INITIAL ENCOUNTER: ICD-10-CM

## 2023-07-24 DIAGNOSIS — S89.91XA INJURY OF RIGHT LOWER LEG, INITIAL ENCOUNTER: Primary | ICD-10-CM

## 2023-07-24 DIAGNOSIS — T14.8XXA HEMATOMA: ICD-10-CM

## 2023-07-24 PROCEDURE — 99203 OFFICE O/P NEW LOW 30 MIN: CPT | Performed by: PEDIATRICS

## 2023-07-24 PROCEDURE — 73590 X-RAY EXAM OF LOWER LEG: CPT | Mod: TC | Performed by: RADIOLOGY

## 2023-07-24 NOTE — PROGRESS NOTES
ASSESSMENT & PLAN    Masha was seen today for pain.    Diagnoses and all orders for this visit:    Injury of right lower leg, initial encounter  -     XR Tibia and Fibula Right 2 Views; Future    Hematoma      This issue is acute and Unchanged.      We discussed these other possible diagnosis: likely hematoma, discussed lower leg and tibial hematoma's can take a while to resolve.    Plan:  - Today's Plan of Care:  Continue with relative rest and activity modification, Ice, Compression, and Elevation.  Can apply ice 10-15 minutes 3-4 times per day as needed. OTC medications as needed.  - If swelling area hardens can switch to alternating heat and ice after 5-7 days    -We also discussed other future treatment options:  Referral to physical Therapy  Imaging like US if doesn't resolve    Follow Up: 1 month if needed    Concerning signs and symptoms were reviewed and all questions were answered at this time.    Mikki Muro MD [facility]  Sports Medicine Physician  [facility]    -----  Chief Complaint   Patient presents with    Right Lower Leg - Pain       SUBJECTIVE  Masha Chery is a/an 67 year old female who is seen as a WALK IN patient for evaluation of right low leg injury.     The patient is seen with their .    Onset: 2 day(s) ago. Patient describes injury as bumping it on a folding table when her and her  were moving it.    Location of Pain: right medial shin, large bump and hematoma  Worsened by: tender to touch   Better with: ice, tylenol, compression socks   Treatments tried: ice and Tylenol  Associated symptoms: tender to touch  - no pain with walking on the leg    Orthopedic/Surgical history: YES - Date: femur surgery - 2010   Social History/Occupation: retired     REVIEW OF SYSTEMS:  Review of Systems    OBJECTIVE:  /77   Pulse 75   Wt 83.9 kg (185 lb)   BMI 31.76 kg/m     General: healthy, alert and in no distress  Skin: no suspicious lesions or rash.  CV:  distal perfusion intact   Resp: normal respiratory effort without conversational dyspnea   Psych: normal mood and affect  Gait: NORMAL  Neuro: Normal light sensory exam of lower extremity    Bilateral Foot and Ankle Exam:    Inspection:       Circumscribed area of edema and ecchymosis noted right medial distal tibia    Foot inspection:       no deformity noted    Tender:       Over area of swelling right medial distal tibia  - soft compartments, no calf pain    Non-Tender:       remainder of ankle and foot bilateral    ROM:        Full active and passive ROM, ankle dorsiflexion, plantarflexion, inversion, eversion, great toe dorsiflexion, remainder of toes, midfoot and subtalar bilateral    Strength:       ankle dorsiflexion 5/5 bilateral       plantarflexion 5/5 bilateral       inversion 5/5 bilateral       eversion 5/5 bilateral    Gait:       normal    Neurovascular:       2+ peripheral pulses bilaterally and brisk capillary refill       sensation grossly intact    RADIOLOGY:  Final results and radiologist's interpretation, available in the [facility] health record.  Images were reviewed with the patient in the office today.  My personal interpretation of the performed imagin XR views of right tib fib reviewed: no acute bony abnormality, no significant degenerative change  - will follow official read    Review of the result(s) of each unique test - XR

## 2023-07-24 NOTE — PATIENT INSTRUCTIONS
We discussed these other possible diagnosis: likely hematoma, discussed lower leg and tibial hematoma's can take a while to resolve.    Plan:  - Today's Plan of Care:  Continue with relative rest and activity modification, Ice, Compression, and Elevation.  Can apply ice 10-15 minutes 3-4 times per day as needed. OTC medications as needed.  - If swelling area hardens can switch to alternating heat and ice after 5-7 days    -We also discussed other future treatment options:  Referral to physical Therapy  Imaging like US if doesn't resolve    Follow Up: 1 month if needed    If you have any further questions for your physician or physician s care team you can call [phone] and use option 3 to leave a voice message.    5

## 2023-07-24 NOTE — LETTER
7/24/2023         RE: Masha Chery  [address] [place] [address]        Dear Colleague,    Thank you for referring your patient, Masha Chery, to the [facility]. Please see a copy of my visit note below.    ASSESSMENT & PLAN    Masha was seen today for pain.    Diagnoses and all orders for this visit:    Injury of right lower leg, initial encounter  -     XR Tibia and Fibula Right 2 Views; Future    Hematoma      This issue is acute and Unchanged.      We discussed these other possible diagnosis: likely hematoma, discussed lower leg and tibial hematoma's can take a while to resolve.    Plan:  - Today's Plan of Care:  Continue with relative rest and activity modification, Ice, Compression, and Elevation.  Can apply ice 10-15 minutes 3-4 times per day as needed. OTC medications as needed.  - If swelling area hardens can switch to alternating heat and ice after 5-7 days    -We also discussed other future treatment options:  Referral to physical Therapy  Imaging like US if doesn't resolve    Follow Up: 1 month if needed    Concerning signs and symptoms were reviewed and all questions were answered at this time.    Mikki Muro MD [facility]  Sports Medicine Physician  [facility]    -----  Chief Complaint   Patient presents with     Right Lower Leg - Pain       SUBJECTIVE  Masha Chery is a/an 67 year old female who is seen as a WALK IN patient for evaluation of right low leg injury.     The patient is seen with their .    Onset: 2 day(s) ago. Patient describes injury as bumping it on a folding table when her and her  were moving it.    Location of Pain: right medial shin, large bump and hematoma  Worsened by: tender to touch   Better with: ice, tylenol, compression socks   Treatments tried: ice and Tylenol  Associated symptoms: tender to touch  - no pain with walking on the leg    Orthopedic/Surgical history: YES - Date:  femur surgery -    Social History/Occupation: retired     REVIEW OF SYSTEMS:  Review of Systems    OBJECTIVE:  /77   Pulse 75   Wt 83.9 kg (185 lb)   BMI 31.76 kg/m     General: healthy, alert and in no distress  Skin: no suspicious lesions or rash.  CV: distal perfusion intact   Resp: normal respiratory effort without conversational dyspnea   Psych: normal mood and affect  Gait: NORMAL  Neuro: Normal light sensory exam of lower extremity    Bilateral Foot and Ankle Exam:    Inspection:       Circumscribed area of edema and ecchymosis noted right medial distal tibia    Foot inspection:       no deformity noted    Tender:       Over area of swelling right medial distal tibia  - soft compartments, no calf pain    Non-Tender:       remainder of ankle and foot bilateral    ROM:        Full active and passive ROM, ankle dorsiflexion, plantarflexion, inversion, eversion, great toe dorsiflexion, remainder of toes, midfoot and subtalar bilateral    Strength:       ankle dorsiflexion 5/5 bilateral       plantarflexion 5/5 bilateral       inversion 5/5 bilateral       eversion 5/5 bilateral    Gait:       normal    Neurovascular:       2+ peripheral pulses bilaterally and brisk capillary refill       sensation grossly intact    RADIOLOGY:  Final results and radiologist's interpretation, available in the [facility] health record.  Images were reviewed with the patient in the office today.  My personal interpretation of the performed imagin XR views of right tib fib reviewed: no acute bony abnormality, no significant degenerative change  - will follow official read    Review of the result(s) of each unique test - XR             Again, thank you for allowing me to participate in the care of your patient.        Sincerely,        Mikki Muro MD

## 2023-07-24 NOTE — TELEPHONE ENCOUNTER
Pt calling back    Pt states the swelling went down but not much at all. Pt wants to be seen today. RN relayed no appointments available at  clinic. RN advised walk-in ortho and gave contact info.     Patient verbalized understanding and in agreement with plan of care.     RN discussed in detail when to call back.     Marline Amado RN

## 2023-07-27 ENCOUNTER — OFFICE VISIT (OUTPATIENT)
Dept: FAMILY MEDICINE | Facility: CLINIC | Age: 68
End: 2023-07-27
Payer: COMMERCIAL

## 2023-07-27 ENCOUNTER — NURSE TRIAGE (OUTPATIENT)
Dept: INTERNAL MEDICINE | Facility: CLINIC | Age: 68
End: 2023-07-27

## 2023-07-27 VITALS
HEIGHT: 64 IN | HEART RATE: 68 BPM | RESPIRATION RATE: 20 BRPM | SYSTOLIC BLOOD PRESSURE: 113 MMHG | WEIGHT: 186 LBS | BODY MASS INDEX: 31.76 KG/M2 | OXYGEN SATURATION: 98 % | DIASTOLIC BLOOD PRESSURE: 77 MMHG | TEMPERATURE: 98.3 F

## 2023-07-27 DIAGNOSIS — T14.8XXA HEMATOMA OF SKIN: Primary | ICD-10-CM

## 2023-07-27 PROCEDURE — 99213 OFFICE O/P EST LOW 20 MIN: CPT | Performed by: NURSE PRACTITIONER

## 2023-07-27 ASSESSMENT — PAIN SCALES - GENERAL: PAINLEVEL: MILD PAIN (3)

## 2023-07-27 NOTE — PROGRESS NOTES
Assessment & Plan     (T14.8XXA) Hematoma of skin  (primary encounter diagnosis)  Comment: seen 7/24 for hematoma and possible fracture, no fracture sx relief discussed, today hematoma spread to ankle probably due to excessive weight bearing yesterday  Plan: continue sx relief,       Patient Instructions   Continue your present treatment    Maricruz Rosado NP, APRN CNP  [facility] J[facility] Flanagan is a 67 year old, presenting for the following health issues:  Bleeding/Bruising and Musculoskeletal Problem        7/27/2023    11:14 AM   Additional Questions   Roomed by Maite ERAZO         7/27/2023    11:14 AM   Patient Reported Additional Medications   Patient reports taking the following new medications none       History of Present Illness       Reason for visit:  Bruising on ankle    She eats 0-1 servings of fruits and vegetables daily.She consumes 1 sweetened beverage(s) daily.She exercises with enough effort to increase her heart rate 9 or less minutes per day.  She exercises with enough effort to increase her heart rate 3 or less days per week.   She is taking medications regularly.       Pain History:  When did you first notice your pain? Saturday 7/22/23   Have you seen anyone else for your pain? Yes - 7/24/23 seen Sports Medicine- Sam  How has your pain affected your ability to work? Not applicable  Where in your body do you have pain? Musculoskeletal problem/pain    Onset/Duration: Saturday-- ankle just started this morning, seen in sports medicine 7/24 and injury evaluated see note below, comes in today as bruising has spread to her ankle      Description  Location: Right ankle/ leg  Joint Swelling: YES  Redness: YES- a little  Pain: YES  Warmth: No  Intensity:  mild  Progression of Symptoms:  worsening  Accompanying signs and symptoms:   Fevers: No  Numbness/tingling/weakness: No  History  Trauma to the area: YES- Bumped into folding table  Recent illness:   "No  Previous similar problem: No  Previous evaluation:  No  Precipitating or alleviating factors:  Aggravating factors include: none  Therapies tried and outcome: rest/inactivity and acetaminophen    SPORTS MEDICINE NOTE:  Injury of right lower leg, initial encounter  -     XR Tibia and Fibula Right 2 Views; Future  Hematoma  This issue is acute and Unchanged.  We discussed these other possible diagnosis: likely hematoma, discussed lower leg and tibial hematoma's can take a while to resolve.     Plan:  - Today's Plan of Care:  Continue with relative rest and activity modification, Ice, Compression, and Elevation.  Can apply ice 10-15 minutes 3-4 times per day as needed. OTC medications as needed.  - If swelling area hardens can switch to alternating heat and ice after 5-7 days     -We also discussed other future treatment options:  Referral to physical Therapy  Imaging like US if doesn't resolve     Follow Up: 1 month if needed      Review of Systems   Constitutional, HEENT, cardiovascular, pulmonary, gi and gu systems are negative, except as otherwise noted.      Objective    /77 (BP Location: Right arm, Patient Position: Sitting, Cuff Size: Adult Large)   Pulse 68   Temp 98.3  F (36.8  C) (Oral)   Resp 20   Ht 1.626 m (5' 4\")   Wt 84.4 kg (186 lb)   SpO2 98%   BMI 31.93 kg/m    Body mass index is 31.93 kg/m .  Physical Exam   GENERAL: healthy, alert and no distress  RESP: lungs clear to auscultation - no rales, rhonchi or wheezes  CV: regular rate and rhythm, normal S1 S2, no S3 or S4, no murmur, click or rub, no peripheral edema and peripheral pulses strong  MS: RLE exam shows bruising, swelling on shin, calf, and right ankle, able to bear weight, gait baseline   PSYCH: mentation appears normal, affect normal/bright                      "

## 2023-07-27 NOTE — TELEPHONE ENCOUNTER
Pt calling. States she bumped her right leg last Saturday. Was seen by ortho and had x rays taken. Last night when she went to bed she noticed a bruise on her ankle. No injury to ankle that pt is aware of. Pt has osteoporosis and is wondering if she should have an x ray of her ankle. Still has bruise on her shin, but now has a bruise on her ankle. Bruise is about 4 inches wide. One on shin is longer and wraps around. Shin bruise is painful. Bruise on ankle is not painful. Bruise on ankle is purple in color now. Can bear weight on her right leg. No medical problems that cause bleeding. Takes aspirin at night. Also takes arthritis tylenol every 8 hours. No openings with PCP today. Assisted with scheduling appt with another provider at  today.    Rut Fernando RN  [facility]    Reason for Disposition   Purple or blood-colored spots or dots that are not from injury or friction (no fever and sounds well to triager)    Additional Information   Negative: Shock suspected (e.g., cold/pale/clammy skin, too weak to stand, low BP, rapid pulse)   Negative: Fever and purple or blood-colored spots or dots   Negative: Sounds like a life-threatening emergency to the triager   Negative: Bruise(s) of forehead or head   Negative: Bruise(s) of face or jaw   Negative: Patient has a concerning injury (e.g., chest, neck, leg)   Negative: Post-operative bruising   Negative: Dizziness or lightheadedness   Negative: Bruise on head, face, chest, or abdomen and taking Coumadin (warfarin) or other strong blood thinner, or known bleeding disorder (e.g., thrombocytopenia)   Negative: Unexplained bleeding from another site (e.g., gums, nose, urine) as well   Negative: Patient sounds very sick or weak to the triager   Negative: SEVERE pain and not improved 2 hours after pain medicine/ice packs    Protocols used: Bruises-A-OH

## 2023-08-03 ENCOUNTER — THERAPY VISIT (OUTPATIENT)
Dept: PHYSICAL THERAPY | Facility: CLINIC | Age: 68
End: 2023-08-03
Attending: FAMILY MEDICINE
Payer: COMMERCIAL

## 2023-08-03 DIAGNOSIS — H81.10 BENIGN PAROXYSMAL POSITIONAL VERTIGO, UNSPECIFIED LATERALITY: ICD-10-CM

## 2023-08-03 PROCEDURE — 97161 PT EVAL LOW COMPLEX 20 MIN: CPT | Mod: GP | Performed by: PHYSICAL THERAPIST

## 2023-08-03 PROCEDURE — 95992 CANALITH REPOSITIONING PROC: CPT | Mod: GP | Performed by: PHYSICAL THERAPIST
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
## 2023-08-21 ENCOUNTER — THERAPY VISIT (OUTPATIENT)
Dept: PHYSICAL THERAPY | Facility: CLINIC | Age: 68
End: 2023-08-21
Attending: FAMILY MEDICINE
Payer: COMMERCIAL

## 2023-08-21 DIAGNOSIS — H81.11 BENIGN PAROXYSMAL POSITIONAL VERTIGO OF RIGHT EAR: Primary | ICD-10-CM

## 2023-08-21 PROCEDURE — 95992 CANALITH REPOSITIONING PROC: CPT | Mod: GP | Performed by: PHYSICAL THERAPIST

## 2023-08-21 PROCEDURE — 97112 NEUROMUSCULAR REEDUCATION: CPT | Mod: GP,59 | Performed by: PHYSICAL THERAPIST

## 2023-09-12 NOTE — PROGRESS NOTES
DISCHARGE  Reason for Discharge: Patient has met all goals. Patient with BPPV and treated with modified epley and resolution of symptoms. Appropriate for discharge at this time.     Equipment Issued: none    Discharge Plan: no further follow up at this time    Referring Provider:  Ladi Oliver         08/21/23 0500   Appointment Info   Signing clinician's name / credentials Mena Ruiz, PT, DPT   Total/Authorized Visits 10   Visits Used 2   Medical Diagnosis BPPV   PT Tx Diagnosis s/s consistent with BPPV   Quick Adds Certification   Progress Note/Certification   Start of Care Date 08/03/23   Onset of illness/injury or Date of Surgery 07/07/23   Therapy Frequency 3-4 more visits over 60 day period   Predicted Duration up to 60 days   Certification date from 08/03/23   Certification date to 10/01/23   PT Goal 1   Goal Identifier position changes   Goal Description Anca will perform bed mobility in 3/3 nights with no reports of dizziness or imbalance feeling in order to resume to prior level of function.   Goal Progress MET, very little dizziness with bed mobility   Target Date 10/01/23   Date Met 08/21/23   PT Goal 2   Goal Identifier head turns with gait   Goal Description Anca will perform head turns with gait when walking in H and V direction without instability or dizziness in order to return to safer walking in the community.   Target Date 10/01/23   Goal Progress improved in hallway, met her goal   Date Met 08/21/23   Subjective Report   Subjective Report Feels a lot better after last treatment-- she feels a little dizziness with looking up and down and sometimes bending but bed mobility is much better   Treatment Interventions (PT)   Interventions Standard Canalith Repositioning;Infrared Goggle Exam or Frenzel Lense Exam;Neuromuscular Re-education   Neuromuscular Re-education   Neuromuscular re-ed of mvmt, balance, coord, kinesthetic sense, posture, proprioception minutes (73811) 15   Neuro Re-ed 1  habituation   Neuro Re-ed 1 - Details Habituation education and practice-- did looking up and bending over x 3-5 reps of each, very mild symptoms and can continue for habituation a few times per day and provided with education on purpose of this for vestib retraining/exercises. Walking with head turns with very few symptoms today and met her goal.   Skilled Intervention habituation and head turning   Patient Response/Progress did well today, slight dizziness with bending and looking up but can continue as part of habituation.   Infrared Goggle Exam or Frenzel Lense Exam   Vestibular Suppressant in Last 24 Hours? No   Exam completed with Infrared Goggles   Spontaneous Nystagmus Negative   Gaze Evoked Nystagmus Negative   Head Shake Horizontal Nystagmus Negative   [place]-Hallpike (Right) Upbeating R torsional   [place]-Hallpike (Right) Comments delayed onset, last for < 5 secs, then noted some upbeating that continues at slow velocity but dizziness decreased   [place]-Hallpike (Left) Other   [place]-Hallpike (Left) Comments noted some down beating, slow speed, no dizziness   [facility] Supine Roll Test (Right) Negative   [facility] Supine Roll Test (Left) Negative   BPPV Canal(s) R Posterior   BPPV Type Canalithasis   Other Infrared Goggle Exam or Frenzel Lense Exam Comments hx of TBI and seeing some downbeats without symptoms so suspect related to this   Standard Canalith Repositioning   Standard canalith repositioning procedure minutes (18259) 20   Canalith Repositioning - Details Goggle testing, see above. Performed 2 modified epley manuevers with good tolerance, decreased symptoms on second manuever and nystagmus. Does have some downbeating that is slow and small amplitude at times.   Skilled Intervention CRPM for BPPV   Patient Response/Progress decreased symptoms on each rep and overall toelrated well   Education   Learner/Method Patient;Significant Other   Plan   Home program habituation wtih bending over and looking up   Plan for next  session no follow up needed at this time   Total Session Time   Timed Code Treatment Minutes 15   Total Treatment Time (sum of timed and untimed services) 35

## 2023-10-06 ENCOUNTER — TRANSFERRED RECORDS (OUTPATIENT)
Dept: HEALTH INFORMATION MANAGEMENT | Facility: CLINIC | Age: 68
End: 2023-10-06
Payer: COMMERCIAL

## 2023-10-17 DIAGNOSIS — K21.00 GASTROESOPHAGEAL REFLUX DISEASE WITH ESOPHAGITIS, UNSPECIFIED WHETHER HEMORRHAGE: ICD-10-CM

## 2023-10-17 RX ORDER — PANTOPRAZOLE SODIUM 40 MG/1
40 TABLET, DELAYED RELEASE ORAL DAILY
Qty: 100 TABLET | Refills: 2 | Status: SHIPPED | OUTPATIENT
Start: 2023-10-17 | End: 2024-01-09

## 2023-11-27 ENCOUNTER — TELEPHONE (OUTPATIENT)
Dept: FAMILY MEDICINE | Facility: CLINIC | Age: 68
End: 2023-11-27

## 2023-11-27 ENCOUNTER — OFFICE VISIT (OUTPATIENT)
Dept: FAMILY MEDICINE | Facility: CLINIC | Age: 68
End: 2023-11-27
Payer: COMMERCIAL

## 2023-11-27 VITALS
HEIGHT: 64 IN | HEART RATE: 64 BPM | SYSTOLIC BLOOD PRESSURE: 121 MMHG | DIASTOLIC BLOOD PRESSURE: 86 MMHG | BODY MASS INDEX: 32.2 KG/M2 | WEIGHT: 188.6 LBS | RESPIRATION RATE: 16 BRPM | OXYGEN SATURATION: 96 %

## 2023-11-27 DIAGNOSIS — H81.13 BENIGN PAROXYSMAL POSITIONAL VERTIGO DUE TO BILATERAL VESTIBULAR DISORDER: ICD-10-CM

## 2023-11-27 DIAGNOSIS — I10 HTN, GOAL BELOW 140/90: ICD-10-CM

## 2023-11-27 DIAGNOSIS — Z01.818 PRE-OPERATIVE EXAMINATION: Primary | ICD-10-CM

## 2023-11-27 DIAGNOSIS — H25.9 AGE-RELATED CATARACT OF BOTH EYES, UNSPECIFIED AGE-RELATED CATARACT TYPE: ICD-10-CM

## 2023-11-27 PROCEDURE — 99214 OFFICE O/P EST MOD 30 MIN: CPT | Performed by: FAMILY MEDICINE

## 2023-11-27 ASSESSMENT — PAIN SCALES - GENERAL: PAINLEVEL: NO PAIN (0)

## 2023-11-27 NOTE — TELEPHONE ENCOUNTER
"Patient calling as she is requesting a prescription for eye drops as she was advised by [facility]. She explains she was instructed by eye provider on AVS to \" eye drops and start drop regimen as directed\". Advised to patient to follow-up with [facility] to further clarify if a prescription was already sent to the pharmacy from their center, or to specify which types of eye drop to prescribe from provider.    Patient verbalized understanding and has no further questions at this time. She will contact the [facility] to clarify and reach out as needed.    TRI Ahumada RN  [facility]  " [place]

## 2023-11-27 NOTE — PROGRESS NOTES
[facility]  [address]  [place] [address]  Phone: [phone]  Primary Provider: Jim Santoyo  Pre-op Performing Provider: YANA MORIN      PREOPERATIVE EVALUATION:  Today's date: 11/27/2023    Masha is a 67 year old, presenting for the following:  Pre-Op Exam        11/27/2023     7:48 AM   Additional Questions   Roomed by Viv CARREON   Accompanied by self     Surgical Information:  Surgery/Procedure: Cataract  Surgery Location: [facility]   Surgeon: Jameson  Surgery Date: 12/11/2023  Time of Surgery:   Where patient plans to recover: At home with family  Fax number for surgical facility: [phone]    Assessment & Plan     The proposed surgical procedure is considered LOW risk.    Pre-operative examination   -  no additional risks identified, perioperative medication management discussed    Age-related cataract of both eyes, unspecified age-related cataract type    -Plan for cataract surgery    HTN, goal below 140/90    - Blood pressure well controlled on hydrochlorothiazide    Benign paroxysmal positional vertigo due to bilateral vestibular disorder   - Takes meclizine as needed         - No identified additional risk factors other than previously addressed    Antiplatelet or Anticoagulation Medication Instructions:   - Bleeding risk is low for this procedure (e.g. dental, skin, cataract).    Additional Medication Instructions:   - Diuretics: can HOLD on the day of surgery.    RECOMMENDATION:  APPROVAL GIVEN to proceed with proposed procedure, without further diagnostic evaluation.    Subjective       HPI related to upcoming procedure: Patient planned for cataract surgery        11/27/2023     7:55 AM   Preop Questions   1. Have you ever had a heart attack or stroke? No   2. Have you ever had surgery on your heart or blood vessels, such as a stent placement, a coronary artery bypass, or surgery on an artery in your head, neck, heart, or legs?  No   3. Do you have chest pain with activity? No   4. Do you have a history of  heart failure? No   5. Do you currently have a cold, bronchitis or symptoms of other infection? No   6. Do you have a cough, shortness of breath, or wheezing? No   7. Do you or anyone in your family have previous history of blood clots? No   8. Do you or does anyone in your family have a serious bleeding problem such as prolonged bleeding following surgeries or cuts? No   9. Have you ever had problems with anemia or been told to take iron pills? No   10. Have you had any abnormal blood loss such as black, tarry or bloody stools, or abnormal vaginal bleeding? No   11. Have you ever had a blood transfusion? YES -    11a. Have you ever had a transfusion reaction? No   12. Are you willing to have a blood transfusion if it is medically needed before, during, or after your surgery? Yes   13. Have you or any of your relatives ever had problems with anesthesia? No   14. Do you have sleep apnea, excessive snoring or daytime drowsiness? No   15. Do you have any artifical heart valves or other implanted medical devices like a pacemaker, defibrillator, or continuous glucose monitor? No   16. Do you have artificial joints? No   17. Are you allergic to latex? No       Health Care Directive:  Patient does not have a Health Care Directive or Living Will: Discussed advance care planning with patient; however, patient declined at this time.    Preoperative Review of :   reviewed - no record of controlled substances prescribed.      Status of Chronic Conditions:  See problem list for active medical problems. Problems all longstanding and stable.    Review of Systems  Constitutional, neuro, ENT, endocrine, pulmonary, cardiac, gastrointestinal, genitourinary, musculoskeletal, integument and psychiatric systems are negative, except as otherwise noted.    Patient Active Problem List    Diagnosis Date Noted    BPPV (benign paroxysmal positional vertigo)  08/03/2023     Priority: Medium    Gastroesophageal reflux disease with esophagitis, unspecified whether hemorrhage 02/13/2023     Priority: Medium    Impaired fasting glucose 02/15/2021     Priority: Medium    Eczematous dermatitis 02/28/2019     Priority: Medium    Hyperlipidemia 02/28/2019     Priority: Medium     Overview:   Created by Conversion      Osteoporosis 02/28/2019     Priority: Medium     Overview:   Created by Conversion    Replacement Utility updated for latest IMO load  Overview:   Diagnosed in 2004 initially. Actonel x 1 year in [id number].  History of femur fracture (fell on ice). No loss of height. Drinks milk daily, cheese, ice cream.  No history of kidney stones or diabetes, no PPI or corticosteroid use. S/P BSO at age 47, family history of Osteopenia.  Vit D 96, TSH, Creatinine and Calcium normal. Alkaline phosphatase 112. Tissue transglutaminase IgA normal. Protein ELP normal. PTH 27, timed urine Calcium 330 mg/24 hours (high),U -NTX 32, midnight saliva Cortisol 1680mg  02/2010: DEXA:  T scores; Spine -2.47, Hip -1.97  01/2015: DEXA:  T scores; Spine -3.0, Femoral neck; L -1.2, Total Femur; L -0.4.      Essential hypertension 05/10/2017     Priority: Medium    Hypercalciuria, idiopathic 08/25/2016     Priority: Medium     Overview:   08/2016: timed urine Calcium 330 mg        Past Medical History:   Diagnosis Date    Hyperlipidemia     Hypertension     Osteoporosis      Past Surgical History:   Procedure Laterality Date    CHOLECYSTECTOMY      COMBINED ESOPHAGOSCOPY, GASTROSCOPY, DUODENOSCOPY (EGD) WITH CO2 INSUFFLATION N/A 9/6/2022    Procedure: ESOPHAGOGASTRODUODENOSCOPY, WITH CO2 INSUFFLATION;  Surgeon: Zion Calles MD;  Location:  OR    ESOPHAGOSCOPY, GASTROSCOPY, DUODENOSCOPY (EGD), COMBINED N/A 9/6/2022    Procedure: ESOPHAGOGASTRODUODENOSCOPY, WITH BIOPSY;  Surgeon: Zion Calles MD;  Location:  OR    FEMUR SURGERY Right 2010    Following fracture, macho and screws  in place    HC REMOVAL GALLBLADDER      Description: Cholecystectomy;  Recorded: 2012;    HYSTERECTOMY, PAP NO LONGER INDICATED       Current Outpatient Medications   Medication Sig Dispense Refill    aspirin (ASA) 325 MG EC tablet Take 1 tablet (325 mg) by mouth daily 90 tablet 3    atorvastatin (LIPITOR) 20 MG tablet Take 1 tablet (20 mg) by mouth daily 90 tablet 3    B Complex Vitamins (VITAMIN B COMPLEX PO) Take 1 tablet by mouth daily      Bioflavonoid Products (VITAMIN C PLUS) 1000 MG TABS Take 1 tablet by mouth daily      calcium carbonate-vitamin D (OS-GILDA) 500-400 MG-UNIT tablet Take 1 tablet by mouth daily Take 1 in AM and 1.5 in PM      cyclobenzaprine (FLEXERIL) 5 MG tablet Take 1 tablet (5 mg) by mouth 2 times daily as needed for muscle spasms 30 tablet 0    EQ ALLERGY RELIEF, CETIRIZINE, 10 MG tablet Take 1 tablet by mouth once daily 90 tablet 0    hydrochlorothiazide (HYDRODIURIL) 25 MG tablet Take 1 tablet (25 mg) by mouth daily 90 tablet 3    MAGNESIUM PO Take 250 mg by mouth daily      Multiple Vitamins-Minerals (MULTIVITAMIN WOMEN) TABS Take 1 tablet by mouth daily      Omega-3 Fatty Acids (FISH OIL) 600 MG CAPS Take 1 capsule by mouth daily      pantoprazole (PROTONIX) 40 MG EC tablet TAKE 1 TABLET BY MOUTH DAILY 100 tablet 2    triamcinolone (KENALOG) 0.1 % external cream Apply to affected area twice per day as needed. 45 g 1    meclizine (ANTIVERT) 25 MG tablet Take 1 tablet (25 mg) by mouth 3 times daily as needed for dizziness (Patient not taking: Reported on 2023) 25 tablet 4       Allergies   Allergen Reactions    Citric Acid Hives     Foods that contain citric acid.     Renacidin Rash     SHE'S ALLERGIC TO CITRIC ACIDS AND SOUR FOODS        Social History     Tobacco Use    Smoking status: Former     Types: Cigarettes     Start date: 1973     Quit date: 1983     Years since quittin.7     Passive exposure: Never    Smokeless tobacco: Never   Substance Use Topics  "   Alcohol use: No     Family History   Problem Relation Age of Onset    Diabetes Mother     Cerebrovascular Disease Father     Coronary Artery Disease Brother 51    Cerebrovascular Disease Sister      History   Drug Use No         Objective     /86 (BP Location: Right arm, Cuff Size: Adult Regular)   Pulse 64   Resp 16   Ht 1.63 m (5' 4.17\")   Wt 85.5 kg (188 lb 9.6 oz)   SpO2 96%   BMI 32.20 kg/m      Physical Exam    GENERAL APPEARANCE: healthy, alert and no distress     HENT: ear canals and TM's normal and nose and mouth without ulcers or lesions     NECK: no adenopathy and thyroid normal to palpation     RESP: lungs clear to auscultation - no rales, rhonchi or wheezes     CV: regular rates and rhythm, normal S1 S2, no S3 or S4 and no murmur, click or rub     ABDOMEN:  soft, nontender, no HSM or masses and bowel sounds normal     MS: extremities normal- no gross deformities noted.     SKIN: no suspicious lesions or rashes     NEURO: Normal strength and tone, mentation intact and speech normal     PSYCH: mentation appears normal. and affect normal/bright    Recent Labs   Lab Test 02/13/23  1801 01/18/22  0912   HGB 13.9 14.6     --     140   POTASSIUM 3.7 3.4   CR 0.72 0.75   A1C 5.5  --         Diagnostics:  No labs were ordered during this visit.   No EKG required for low risk surgery (cataract, skin procedure, breast biopsy, etc).    Revised Cardiac Risk Index (RCRI):  The patient has the following serious cardiovascular risks for perioperative complications:   - No serious cardiac risks = 0 points     RCRI Interpretation: 0 points: Class I (very low risk - 0.4% complication rate)         Signed Electronically by: Michael El MD  Copy of this evaluation report is provided to requesting physician.      "

## 2024-01-08 DIAGNOSIS — H81.10 BENIGN PAROXYSMAL POSITIONAL VERTIGO, UNSPECIFIED LATERALITY: ICD-10-CM

## 2024-01-08 DIAGNOSIS — R07.81 RIB PAIN ON RIGHT SIDE: ICD-10-CM

## 2024-01-08 DIAGNOSIS — K21.00 GASTROESOPHAGEAL REFLUX DISEASE WITH ESOPHAGITIS, UNSPECIFIED WHETHER HEMORRHAGE: ICD-10-CM

## 2024-01-08 DIAGNOSIS — E78.2 MIXED HYPERLIPIDEMIA: ICD-10-CM

## 2024-01-08 DIAGNOSIS — I10 ESSENTIAL HYPERTENSION: ICD-10-CM

## 2024-01-08 NOTE — TELEPHONE ENCOUNTER
Reason for Call:  Other prescription    Detailed comments: patient called and needs all medication prescribed by Yarelis Paul at [facility]  prior to appt in Feb.    Would like to pickup at [facility]    Please contact patient.  Thank you.    Phone Number Patient can be reached at: Home number on file [phone] (home)    Best Time: any    Can we leave a detailed message on this number? YES    Call taken on 1/8/2024 at 3:09 PM by Yanet العلي

## 2024-01-09 RX ORDER — PANTOPRAZOLE SODIUM 40 MG/1
40 TABLET, DELAYED RELEASE ORAL DAILY
Qty: 100 TABLET | Refills: 2 | Status: SHIPPED | OUTPATIENT
Start: 2024-01-09

## 2024-01-09 RX ORDER — CYCLOBENZAPRINE HCL 5 MG
5 TABLET ORAL 2 TIMES DAILY PRN
Qty: 30 TABLET | Refills: 0 | Status: SHIPPED | OUTPATIENT
Start: 2024-01-09

## 2024-01-09 RX ORDER — HYDROCHLOROTHIAZIDE 25 MG/1
25 TABLET ORAL DAILY
Qty: 90 TABLET | Refills: 3 | Status: SHIPPED | OUTPATIENT
Start: 2024-01-09

## 2024-01-09 RX ORDER — ATORVASTATIN CALCIUM 20 MG/1
20 TABLET, FILM COATED ORAL DAILY
Qty: 90 TABLET | Refills: 3 | Status: SHIPPED | OUTPATIENT
Start: 2024-01-09

## 2024-01-09 RX ORDER — MECLIZINE HYDROCHLORIDE 25 MG/1
25 TABLET ORAL 3 TIMES DAILY PRN
Qty: 25 TABLET | Refills: 4 | Status: SHIPPED | OUTPATIENT
Start: 2024-01-09

## 2024-01-16 ENCOUNTER — TRANSFERRED RECORDS (OUTPATIENT)
Dept: HEALTH INFORMATION MANAGEMENT | Facility: CLINIC | Age: 69
End: 2024-01-16
Payer: COMMERCIAL

## 2024-02-22 ENCOUNTER — OFFICE VISIT (OUTPATIENT)
Dept: FAMILY MEDICINE | Facility: CLINIC | Age: 69
End: 2024-02-22
Payer: COMMERCIAL

## 2024-02-22 VITALS
DIASTOLIC BLOOD PRESSURE: 84 MMHG | OXYGEN SATURATION: 95 % | RESPIRATION RATE: 18 BRPM | HEIGHT: 63 IN | TEMPERATURE: 98 F | HEART RATE: 65 BPM | WEIGHT: 190.8 LBS | SYSTOLIC BLOOD PRESSURE: 125 MMHG | BODY MASS INDEX: 33.81 KG/M2

## 2024-02-22 DIAGNOSIS — M80.0B2A AGE-RELATED OSTEOPOROSIS WITH CURRENT PATHOLOGICAL FRACTURE, LEFT PELVIS, INITIAL ENCOUNTER FOR FRACTURE: ICD-10-CM

## 2024-02-22 DIAGNOSIS — Z29.11 NEED FOR VACCINATION AGAINST RESPIRATORY SYNCYTIAL VIRUS: ICD-10-CM

## 2024-02-22 DIAGNOSIS — I10 ESSENTIAL HYPERTENSION: Primary | ICD-10-CM

## 2024-02-22 DIAGNOSIS — E78.5 HYPERLIPIDEMIA, UNSPECIFIED HYPERLIPIDEMIA TYPE: ICD-10-CM

## 2024-02-22 DIAGNOSIS — M85.80 OSTEOPENIA, UNSPECIFIED LOCATION: ICD-10-CM

## 2024-02-22 LAB
ANION GAP SERPL CALCULATED.3IONS-SCNC: 12 MMOL/L (ref 7–15)
BASOPHILS # BLD AUTO: 0 10E3/UL (ref 0–0.2)
BASOPHILS NFR BLD AUTO: 0 %
BUN SERPL-MCNC: 19.3 MG/DL (ref 8–23)
CALCIUM SERPL-MCNC: 9.5 MG/DL (ref 8.8–10.2)
CHLORIDE SERPL-SCNC: 100 MMOL/L (ref 98–107)
CHOLEST SERPL-MCNC: 177 MG/DL
CREAT SERPL-MCNC: 0.66 MG/DL (ref 0.51–0.95)
DEPRECATED HCO3 PLAS-SCNC: 27 MMOL/L (ref 22–29)
EGFRCR SERPLBLD CKD-EPI 2021: >90 ML/MIN/1.73M2
EOSINOPHIL # BLD AUTO: 0.2 10E3/UL (ref 0–0.7)
EOSINOPHIL NFR BLD AUTO: 3 %
ERYTHROCYTE [DISTWIDTH] IN BLOOD BY AUTOMATED COUNT: 13 % (ref 10–15)
FASTING STATUS PATIENT QL REPORTED: NO
GLUCOSE SERPL-MCNC: 87 MG/DL (ref 70–99)
HCT VFR BLD AUTO: 41.1 % (ref 35–47)
HDLC SERPL-MCNC: 59 MG/DL
HGB BLD-MCNC: 13.8 G/DL (ref 11.7–15.7)
IMM GRANULOCYTES # BLD: 0 10E3/UL
IMM GRANULOCYTES NFR BLD: 0 %
LDLC SERPL CALC-MCNC: 74 MG/DL
LYMPHOCYTES # BLD AUTO: 2.7 10E3/UL (ref 0.8–5.3)
LYMPHOCYTES NFR BLD AUTO: 40 %
MCH RBC QN AUTO: 30.2 PG (ref 26.5–33)
MCHC RBC AUTO-ENTMCNC: 33.6 G/DL (ref 31.5–36.5)
MCV RBC AUTO: 90 FL (ref 78–100)
MONOCYTES # BLD AUTO: 0.5 10E3/UL (ref 0–1.3)
MONOCYTES NFR BLD AUTO: 8 %
NEUTROPHILS # BLD AUTO: 3.4 10E3/UL (ref 1.6–8.3)
NEUTROPHILS NFR BLD AUTO: 49 %
NONHDLC SERPL-MCNC: 118 MG/DL
PLATELET # BLD AUTO: 253 10E3/UL (ref 150–450)
POTASSIUM SERPL-SCNC: 3.7 MMOL/L (ref 3.4–5.3)
RBC # BLD AUTO: 4.57 10E6/UL (ref 3.8–5.2)
SODIUM SERPL-SCNC: 139 MMOL/L (ref 135–145)
TRIGL SERPL-MCNC: 221 MG/DL
WBC # BLD AUTO: 6.8 10E3/UL (ref 4–11)

## 2024-02-22 PROCEDURE — 80061 LIPID PANEL: CPT | Performed by: INTERNAL MEDICINE

## 2024-02-22 PROCEDURE — G0008 ADMIN INFLUENZA VIRUS VAC: HCPCS | Performed by: INTERNAL MEDICINE

## 2024-02-22 PROCEDURE — 91320 SARSCV2 VAC 30MCG TRS-SUC IM: CPT | Performed by: INTERNAL MEDICINE

## 2024-02-22 PROCEDURE — 90662 IIV NO PRSV INCREASED AG IM: CPT | Performed by: INTERNAL MEDICINE

## 2024-02-22 PROCEDURE — 85025 COMPLETE CBC W/AUTO DIFF WBC: CPT | Performed by: INTERNAL MEDICINE

## 2024-02-22 PROCEDURE — G0439 PPPS, SUBSEQ VISIT: HCPCS | Performed by: INTERNAL MEDICINE

## 2024-02-22 PROCEDURE — 90480 ADMN SARSCOV2 VAC 1/ONLY CMP: CPT | Performed by: INTERNAL MEDICINE

## 2024-02-22 PROCEDURE — 80048 BASIC METABOLIC PNL TOTAL CA: CPT | Performed by: INTERNAL MEDICINE

## 2024-02-22 PROCEDURE — 36415 COLL VENOUS BLD VENIPUNCTURE: CPT | Performed by: INTERNAL MEDICINE

## 2024-02-22 PROCEDURE — G0009 ADMIN PNEUMOCOCCAL VACCINE: HCPCS | Performed by: INTERNAL MEDICINE

## 2024-02-22 PROCEDURE — 90677 PCV20 VACCINE IM: CPT | Performed by: INTERNAL MEDICINE

## 2024-02-22 RX ORDER — RESPIRATORY SYNCYTIAL VIRUS VACCINE 120MCG/0.5
0.5 KIT INTRAMUSCULAR ONCE
Qty: 1 EACH | Refills: 0 | Status: CANCELLED | OUTPATIENT
Start: 2024-02-22 | End: 2024-02-22

## 2024-02-22 SDOH — HEALTH STABILITY: PHYSICAL HEALTH: ON AVERAGE, HOW MANY MINUTES DO YOU ENGAGE IN EXERCISE AT THIS LEVEL?: 10 MIN

## 2024-02-22 SDOH — HEALTH STABILITY: PHYSICAL HEALTH: ON AVERAGE, HOW MANY DAYS PER WEEK DO YOU ENGAGE IN MODERATE TO STRENUOUS EXERCISE (LIKE A BRISK WALK)?: 1 DAY

## 2024-02-22 ASSESSMENT — SOCIAL DETERMINANTS OF HEALTH (SDOH): HOW OFTEN DO YOU GET TOGETHER WITH FRIENDS OR RELATIVES?: MORE THAN THREE TIMES A WEEK

## 2024-02-22 ASSESSMENT — PAIN SCALES - GENERAL: PAINLEVEL: NO PAIN (0)

## 2024-02-22 NOTE — LETTER
February 26, 2024    Masha Chery  [address] [place] [id number]          Dear ,    We are writing to inform you of your test results.    Labs look good except for mild elevation in the triglycerides.  Watch animal fats and sugars and alcohol in the diet.    Resulted Orders   BASIC METABOLIC PANEL   Result Value Ref Range    Sodium 139 135 - 145 mmol/L      Comment:      Reference intervals for this test were updated on 09/26/2023 to more accurately reflect our healthy population. There may be differences in the flagging of prior results with similar values performed with this method. Interpretation of those prior results can be made in the context of the updated reference intervals.     Potassium 3.7 3.4 - 5.3 mmol/L    Chloride 100 98 - 107 mmol/L    Carbon Dioxide (CO2) 27 22 - 29 mmol/L    Anion Gap 12 7 - 15 mmol/L    Urea Nitrogen 19.3 8.0 - 23.0 mg/dL    Creatinine 0.66 0.51 - 0.95 mg/dL    GFR Estimate >90 >60 mL/min/1.73m2    Calcium 9.5 8.8 - 10.2 mg/dL    Glucose 87 70 - 99 mg/dL   Lipid panel reflex to direct LDL Non-fasting   Result Value Ref Range    Cholesterol 177 <200 mg/dL    Triglycerides 221 (H) <150 mg/dL    Direct Measure HDL 59 >=50 mg/dL    LDL Cholesterol Calculated 74 <=100 mg/dL    Non HDL Cholesterol 118 <130 mg/dL    Patient Fasting > 8hrs? No     Narrative    Cholesterol  Desirable:  <200 mg/dL    Triglycerides  Normal:  Less than 150 mg/dL  Borderline High:  150-199 mg/dL  High:  200-499 mg/dL  Very High:  Greater than or equal to 500 mg/dL    Direct Measure HDL  Female:  Greater than or equal to 50 mg/dL   Male:  Greater than or equal to 40 mg/dL    LDL Cholesterol  Desirable:  <100mg/dL  Above Desirable:  100-129 mg/dL   Borderline High:  130-159 mg/dL   High:  160-189 mg/dL   Very High:  >= 190 mg/dL    Non HDL Cholesterol  Desirable:  130 mg/dL  Above Desirable:  130-159 mg/dL  Borderline High:  160-189 mg/dL  High:  190-219 mg/dL  Very High:  Greater than or  equal to 220 mg/dL   CBC with platelets and differential   Result Value Ref Range    WBC Count 6.8 4.0 - 11.0 10e3/uL    RBC Count 4.57 3.80 - 5.20 10e6/uL    Hemoglobin 13.8 11.7 - 15.7 g/dL    Hematocrit 41.1 35.0 - 47.0 %    MCV 90 78 - 100 fL    MCH 30.2 26.5 - 33.0 pg    MCHC 33.6 31.5 - 36.5 g/dL    RDW 13.0 10.0 - 15.0 %    Platelet Count 253 150 - 450 10e3/uL    % Neutrophils 49 %    % Lymphocytes 40 %    % Monocytes 8 %    % Eosinophils 3 %    % Basophils 0 %    % Immature Granulocytes 0 %    Absolute Neutrophils 3.4 1.6 - 8.3 10e3/uL    Absolute Lymphocytes 2.7 0.8 - 5.3 10e3/uL    Absolute Monocytes 0.5 0.0 - 1.3 10e3/uL    Absolute Eosinophils 0.2 0.0 - 0.7 10e3/uL    Absolute Basophils 0.0 0.0 - 0.2 10e3/uL    Absolute Immature Granulocytes 0.0 <=0.4 10e3/uL     If you have any questions or concerns, please call the clinic at the number listed above.     Sincerely,    Jim Santoyo MD

## 2024-03-06 ENCOUNTER — ANCILLARY PROCEDURE (OUTPATIENT)
Dept: BONE DENSITY | Facility: CLINIC | Age: 69
End: 2024-03-06
Attending: INTERNAL MEDICINE
Payer: COMMERCIAL

## 2024-03-06 DIAGNOSIS — M85.80 OSTEOPENIA, UNSPECIFIED LOCATION: ICD-10-CM

## 2024-03-06 DIAGNOSIS — M80.0B2A AGE-RELATED OSTEOPOROSIS WITH CURRENT PATHOLOGICAL FRACTURE, LEFT PELVIS, INITIAL ENCOUNTER FOR FRACTURE: ICD-10-CM

## 2024-03-06 PROCEDURE — 77080 DXA BONE DENSITY AXIAL: CPT | Mod: TC | Performed by: RADIOLOGY

## 2024-03-22 ENCOUNTER — TELEPHONE (OUTPATIENT)
Dept: FAMILY MEDICINE | Facility: CLINIC | Age: 69
End: 2024-03-22
Payer: COMMERCIAL

## 2024-03-22 NOTE — TELEPHONE ENCOUNTER
Spoke with patient. Relayed provider's message as written. Patient verbalized understanding and has no further questions at this time.    Assisted with booking appointment on 3/28 at 11 am through telephone visit as requested.    TRI Ahumada RN  [facility]

## 2024-03-22 NOTE — TELEPHONE ENCOUNTER
"Patient calling  She received result of her recent Dexa scan in the mail.  She asked if the scan showed a new fracture as she noted that fracture was mentioned under \"indication\"  Explained that the test checks bone density, not fractures.  Explained that the indication would be the reason the test was ordered, not the findings on the scan.  Patient verbalized understanding.    Patient stated that she had been on fosamax in the past and taken off of it a couple of times after fractures.  She is asking if Prolia shots would be appropriate for her    Ok to leave detailed message on patient's VM    Francie Hendrix RN  [facility]  "

## 2024-03-22 NOTE — TELEPHONE ENCOUNTER
Prolia would be a reasonable option - however there are some important things to understand about risk and benefit of the treatment that we should discuss before starting     Ok to set up in person or virtual visit to discuss

## 2024-03-28 ENCOUNTER — VIRTUAL VISIT (OUTPATIENT)
Dept: FAMILY MEDICINE | Facility: CLINIC | Age: 69
End: 2024-03-28
Payer: COMMERCIAL

## 2024-03-28 DIAGNOSIS — M80.00XD AGE-RELATED OSTEOPOROSIS WITH CURRENT PATHOLOGICAL FRACTURE WITH ROUTINE HEALING, SUBSEQUENT ENCOUNTER: Primary | ICD-10-CM

## 2024-03-28 PROCEDURE — 99207 E-CONSULT TO ENDOCRINOLOGY (ADULT OUTPT PROVIDER TO SPECIALIST WRITTEN QUESTION & RESPONSE): CPT | Performed by: INTERNAL MEDICINE

## 2024-03-28 PROCEDURE — 99442 PR PHYSICIAN TELEPHONE EVALUATION 11-20 MIN: CPT | Mod: 93 | Performed by: INTERNAL MEDICINE

## 2024-03-28 NOTE — PROGRESS NOTES
Masha is a 68 year old who is being evaluated via a billable telephone visit.    What phone number would you like to be contacted at? [phone]  How would you like to obtain your AVS? Mail a copy  Originating Location (pt. Location): Home    Distant Location (provider location):  On-site    Assessment & Plan   Problem List Items Addressed This Visit       Osteoporosis - Primary    Relevant Orders    Adult E-Consult to Endocrinology (Outpt Provider to Specialist Written Question & Response)      Patient with previous fracture, osteoporosis   Was on fosamax     Considering prolia or reclast will e consult endocrine for recommendations as reclast seem the preferred treatment at this stage    Femur fractured 2022      History of right femur fracture in 2010. Has osteoporosis. Left femur fracture approximately 2 year ago secondary to bisphosphonate-Fosamax. Currently taking over-the-counter calcium and vitamin D. Does not smoke. Tries to be active.    Has reflux. Takes omeprazle daily, 40 mg. Will still have heartburn/reflux. Taking additional over-the-counter Tums. 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 Was recommended to continue omeprazole definitely. Does not take any over-the-counter NSAIDs.                Work on weight loss  Regular exercise      Subjective   Masha is a 68 year old, presenting for the following health issues:  Medication Follow-up        3/28/2024    10:31 AM   Additional Questions   Roomed by Jaclyn PERES     Discuss prolia injection; restart it  Fosamax was on when broke femutr in 2010  Took off     Calcium and vitamin D   Fractured left femur   Mail         Review of Systems  Constitutional, HEENT, cardiovascular, pulmonary, gi and gu systems are negative, except as otherwise noted.      Objective           Vitals:  No vitals were obtained today due to virtual visit.    Physical Exam   General: Alert and no distress //Respiratory: No audible wheeze, cough,  or shortness of breath // Psychiatric:  Appropriate affect, tone, and pace of words      No results found for any visits on 03/28/24.      Phone call duration: 18 minutes  Signed Electronically by: Jim Santoyo MD

## 2024-04-05 ENCOUNTER — E-CONSULT (OUTPATIENT)
Dept: ENDOCRINOLOGY | Facility: CLINIC | Age: 69
End: 2024-04-05
Payer: COMMERCIAL

## 2024-04-05 PROCEDURE — 99451 NTRPROF PH1/NTRNET/EHR 5/>: CPT

## 2024-04-05 NOTE — PROGRESS NOTES
"    4/5/2024     E-Consult has been accepted.    Interprofessional consultation requested by:  Jim Santoyo MD      Clinical Question/Purpose: MY CLINICAL QUESTION IS: Patient with previous fracture and remote treatment with fosamax, is interested in prolia vs reclast.  I think she may have better response with reclast but would like opinion on options for this patient     Patient assessment and information reviewed:   1990 age 35-35 had total hysterectomy followed by HRT x 5 years  7/2/15 endocrinology note with Dr Ibarra refers to 15 year history of steoporosis and past treatment with alendronate and actonel (x 2 years, during which time she had right femur fracture with fall on ice, ORIF treatment; off actonel since 2010).  He advised Prolia.  -- I can't tell if it was given  8/18/16 endocrinology note with Dr Conner Zamudio -he notes she was not on treatment at the time.      6/1/22 atypical femoral fracture LEFT   3/6/24 DXA Lumbar Spine: L2-L3: BMD: 0.843 g/cm2. T-score: -3.0. Z-score: -1.3.  -LEFT Hip Total: BMD: 0.894 g/cm2. T-score: -0.9. Z-score: 0.5.  -LEFT Hip Femoral neck: BMD: 0.786 g/cm2. T-score: -1.8. Z-score: -0.2.  Compared with 3/4/22 o change in lumbar spine BMD.  -There has been a 9.4% decrease in left total hip BMD    Relevant labs  2/14/16 Ca 8.8, pTH 56  4/12/16 25OHD > 96   8/3/16 25 OHD > 96   8/18/16 a 9.4, PTH 27.2, SPEP no monoclonal  8/21/16 midnight salivary cortisol 1680  (normal < 100 ng/dl)  8/22/16 24 hour urine calcium 330 mg/24 hours  2/28/19 Ca 8.9  2/26/2020 25OHD 65, PTH 56, phosphorus 3.1   1/18/22 25oHD 73, alk phos 69  2/13/23 alk phos 87  2/22/24 weight 190, BMI 33.8, Ca 9.5, creatinine 0.66, eGFR >90    Imaging  2/19/10 DXA ([facility]) lowest T-score L spine  -2.47  7/7/23 MRI adrian ([facility]): \"sella . No abnormality\"    Per the MAR   2/7/2019-6/1/22 alendronate -stopped when she had atypical femoral fracture    Current Outpatient Medications   Medication Sig Dispense " Refill    aspirin (ASA) 325 MG EC tablet Take 1 tablet (325 mg) by mouth daily 90 tablet 3    atorvastatin (LIPITOR) 20 MG tablet Take 1 tablet (20 mg) by mouth daily 90 tablet 3    B Complex Vitamins (VITAMIN B COMPLEX PO) Take 1 tablet by mouth daily      Bioflavonoid Products (VITAMIN C PLUS) 1000 MG TABS Take 1 tablet by mouth daily      calcium carbonate-vitamin D (OS-GILDA) 500-400 MG-UNIT tablet Take 1 tablet by mouth daily Take 1 in AM and 1.5 in PM      cyclobenzaprine (FLEXERIL) 5 MG tablet Take 1 tablet (5 mg) by mouth 2 times daily as needed for muscle spasms 30 tablet 0    EQ ALLERGY RELIEF, CETIRIZINE, 10 MG tablet Take 1 tablet by mouth once daily 90 tablet 0    hydrochlorothiazide (HYDRODIURIL) 25 MG tablet Take 1 tablet (25 mg) by mouth daily 90 tablet 3    MAGNESIUM PO Take 250 mg by mouth daily      meclizine (ANTIVERT) 25 MG tablet Take 1 tablet (25 mg) by mouth 3 times daily as needed for dizziness 25 tablet 4    Multiple Vitamins-Minerals (MULTIVITAMIN WOMEN) TABS Take 1 tablet by mouth daily      Omega-3 Fatty Acids (FISH OIL) 600 MG CAPS Take 1 capsule by mouth daily      pantoprazole (PROTONIX) 40 MG EC tablet Take 1 tablet (40 mg) by mouth daily 100 tablet 2    triamcinolone (KENALOG) 0.1 % external cream Apply to affected area twice per day as needed. 45 g 1     Impression:   Osteoporosis  Postmenopausal , surgical menopause 1990 approx age [age] ; history of HRT x 5 years . -- this is a risk for the bone .  History of 2010 RIGHT  femur osteoporotic  fracture, ORIF  History of 2022 atypical LEFT femur fracture on alendronate. The record suggests she had been on alendronate only 3 years at the time this occurred.  Is this correct?   High salivary cortisol noted 8/21/16 - this needs follow up  Hypercalcuria noted in 2016 .   Hypervitaminosis D in 2016     Recommendations:   Concern for her would be the history of atypical femoral fracture on bisphosphonate.  Why did that happen?  I would  hesitate to use bisphosphonate or denosumab with this history.      The high salivary cortisol noted in 2016 needs follow up . Recommend before 9  AM labs including cortisol, aCTH, alkaline phosphatase, phosphorus, vitamin D deficiency  and also another midnight salivary cortisol.  Endocrine consult if any are abnormal.    IF normal, would first have her do 24 hour urine collection for calcium and creatinine.  If urine calcium is not high, she might best be treated with teriparatide for 2 years followed by Prolia or bisphosphonate.  If urine calcium remains > 250, then endocrine consult with Lou Cummins or [place].     The recommendations provided in this E-Consult are based on a review of clinical data pertinent to the clinical question presented, without a review of the patient's complete medical record or, the benefit of a comprehensive in-person or virtual patient evaluation. This consultation should not replace the clinical judgement and evaluation of the provider ordering this E-Consult. Any new clinical issues, or changes in patient status since the filing of this E-Consult will need to be taken into account when assessing these recommendations. Please contact me if you have further questions.    My total time spent reviewing clinical information and formulating assessment was 30 minutes.        Helga Patel MD

## 2024-04-15 ENCOUNTER — ANCILLARY PROCEDURE (OUTPATIENT)
Dept: MAMMOGRAPHY | Facility: CLINIC | Age: 69
End: 2024-04-15
Attending: INTERNAL MEDICINE
Payer: COMMERCIAL

## 2024-04-15 DIAGNOSIS — Z12.31 VISIT FOR SCREENING MAMMOGRAM: ICD-10-CM

## 2024-04-15 PROCEDURE — 77067 SCR MAMMO BI INCL CAD: CPT | Mod: TC | Performed by: STUDENT IN AN ORGANIZED HEALTH CARE EDUCATION/TRAINING PROGRAM

## 2024-06-17 ENCOUNTER — OFFICE VISIT (OUTPATIENT)
Dept: FAMILY MEDICINE | Facility: CLINIC | Age: 69
End: 2024-06-17
Payer: COMMERCIAL

## 2024-06-17 VITALS
HEIGHT: 63 IN | SYSTOLIC BLOOD PRESSURE: 133 MMHG | TEMPERATURE: 97.6 F | WEIGHT: 190 LBS | OXYGEN SATURATION: 97 % | RESPIRATION RATE: 16 BRPM | HEART RATE: 60 BPM | DIASTOLIC BLOOD PRESSURE: 82 MMHG | BODY MASS INDEX: 33.66 KG/M2

## 2024-06-17 DIAGNOSIS — H10.11 ALLERGIC CONJUNCTIVITIS, RIGHT: Primary | ICD-10-CM

## 2024-06-17 PROCEDURE — 99213 OFFICE O/P EST LOW 20 MIN: CPT | Performed by: FAMILY MEDICINE

## 2024-06-17 RX ORDER — RESPIRATORY SYNCYTIAL VIRUS VACCINE 120MCG/0.5
0.5 KIT INTRAMUSCULAR ONCE
Qty: 1 EACH | Refills: 0 | Status: CANCELLED | OUTPATIENT
Start: 2024-06-17 | End: 2024-06-17

## 2024-06-17 RX ORDER — OLOPATADINE HYDROCHLORIDE 2 MG/ML
1 SOLUTION/ DROPS OPHTHALMIC DAILY
Qty: 2.5 ML | Refills: 1 | Status: SHIPPED | OUTPATIENT
Start: 2024-06-17

## 2024-06-17 ASSESSMENT — ENCOUNTER SYMPTOMS: EYE PAIN: 1

## 2024-06-17 NOTE — PROGRESS NOTES
"  Assessment & Plan   Problem List Items Addressed This Visit    None  Visit Diagnoses       Allergic conjunctivitis, right    -  Primary    Relevant Medications    olopatadine (PATADAY) 0.2 % ophthalmic solution           Stain no corneal abrasion, if symptoms persist return to clinic or optometry        Subjective   Masha is a 68 year old, presenting for the following health issues:  Eye Problem (Feels like there is sand in her eye for 1 week)        6/17/2024     2:04 PM   Additional Questions   Roomed by Michelle RAMACHANDRAN CMA     History of Present Illness       Reason for visit:  Sand in right eye  Symptom onset:  1-2 weeks ago    She eats 0-1 servings of fruits and vegetables daily.She consumes 1 sweetened beverage(s) daily.She exercises with enough effort to increase her heart rate 10 to 19 minutes per day.  She exercises with enough effort to increase her heart rate 3 or less days per week.   She is taking medications regularly.             Objective    /82 (BP Location: Left arm, Patient Position: Chair, Cuff Size: Adult Regular)   Pulse 60   Temp 97.6  F (36.4  C) (Temporal)   Resp 16   Ht 1.6 m (5' 3\")   Wt 86.2 kg (190 lb)   SpO2 97%   BMI 33.66 kg/m    Body mass index is 33.66 kg/m .  Physical Exam   Gen NAD  Proparacaine applied to the right eye with fluorescein stain, black light with magnification showed no corneal abrasion to the right eye            Signed Electronically by: DAVID SOUZA DO    "

## 2024-08-23 DIAGNOSIS — K21.00 GASTROESOPHAGEAL REFLUX DISEASE WITH ESOPHAGITIS, UNSPECIFIED WHETHER HEMORRHAGE: ICD-10-CM

## 2024-08-26 RX ORDER — PANTOPRAZOLE SODIUM 40 MG/1
40 TABLET, DELAYED RELEASE ORAL DAILY
Qty: 100 TABLET | Refills: 2 | OUTPATIENT
Start: 2024-08-26

## 2024-09-17 DIAGNOSIS — I10 ESSENTIAL HYPERTENSION: ICD-10-CM

## 2024-09-17 DIAGNOSIS — E78.2 MIXED HYPERLIPIDEMIA: ICD-10-CM

## 2024-09-18 RX ORDER — ATORVASTATIN CALCIUM 20 MG/1
20 TABLET, FILM COATED ORAL DAILY
Qty: 100 TABLET | Refills: 2 | OUTPATIENT
Start: 2024-09-18

## 2024-09-18 RX ORDER — HYDROCHLOROTHIAZIDE 25 MG/1
25 TABLET ORAL DAILY
Qty: 100 TABLET | Refills: 2 | OUTPATIENT
Start: 2024-09-18

## 2024-11-13 DIAGNOSIS — I10 ESSENTIAL HYPERTENSION: ICD-10-CM

## 2024-11-13 DIAGNOSIS — E78.2 MIXED HYPERLIPIDEMIA: ICD-10-CM

## 2024-11-15 RX ORDER — ATORVASTATIN CALCIUM 20 MG/1
20 TABLET, FILM COATED ORAL DAILY
Qty: 100 TABLET | Refills: 0 | Status: SHIPPED | OUTPATIENT
Start: 2024-11-15

## 2024-11-15 RX ORDER — HYDROCHLOROTHIAZIDE 25 MG/1
25 TABLET ORAL DAILY
Qty: 100 TABLET | Refills: 0 | Status: SHIPPED | OUTPATIENT
Start: 2024-11-15

## 2024-12-02 DIAGNOSIS — K21.00 GASTROESOPHAGEAL REFLUX DISEASE WITH ESOPHAGITIS, UNSPECIFIED WHETHER HEMORRHAGE: ICD-10-CM

## 2024-12-02 RX ORDER — PANTOPRAZOLE SODIUM 40 MG/1
40 TABLET, DELAYED RELEASE ORAL DAILY
Qty: 100 TABLET | Refills: 0 | Status: SHIPPED | OUTPATIENT
Start: 2024-12-02

## 2025-01-29 ENCOUNTER — TRANSFERRED RECORDS (OUTPATIENT)
Dept: HEALTH INFORMATION MANAGEMENT | Facility: CLINIC | Age: 70
End: 2025-01-29

## 2025-02-01 DIAGNOSIS — E78.2 MIXED HYPERLIPIDEMIA: ICD-10-CM

## 2025-02-01 DIAGNOSIS — I10 ESSENTIAL HYPERTENSION: ICD-10-CM

## 2025-02-03 ENCOUNTER — OFFICE VISIT (OUTPATIENT)
Dept: FAMILY MEDICINE | Facility: CLINIC | Age: 70
End: 2025-02-03
Payer: COMMERCIAL

## 2025-02-03 VITALS
BODY MASS INDEX: 34.73 KG/M2 | WEIGHT: 196 LBS | SYSTOLIC BLOOD PRESSURE: 125 MMHG | RESPIRATION RATE: 18 BRPM | HEART RATE: 58 BPM | HEIGHT: 63 IN | DIASTOLIC BLOOD PRESSURE: 78 MMHG | TEMPERATURE: 98.1 F | OXYGEN SATURATION: 96 %

## 2025-02-03 DIAGNOSIS — Z12.11 SCREEN FOR COLON CANCER: Primary | ICD-10-CM

## 2025-02-03 DIAGNOSIS — Z00.00 ENCOUNTER FOR MEDICARE ANNUAL WELLNESS EXAM: ICD-10-CM

## 2025-02-03 DIAGNOSIS — I10 ESSENTIAL HYPERTENSION: ICD-10-CM

## 2025-02-03 DIAGNOSIS — I35.8 AORTIC VALVE SCLEROSIS: ICD-10-CM

## 2025-02-03 DIAGNOSIS — E78.49 OTHER HYPERLIPIDEMIA: ICD-10-CM

## 2025-02-03 DIAGNOSIS — K21.00 GASTROESOPHAGEAL REFLUX DISEASE WITH ESOPHAGITIS, UNSPECIFIED WHETHER HEMORRHAGE: ICD-10-CM

## 2025-02-03 LAB
ALBUMIN SERPL BCG-MCNC: 4.4 G/DL (ref 3.5–5.2)
ALP SERPL-CCNC: 88 U/L (ref 40–150)
ALT SERPL W P-5'-P-CCNC: 24 U/L (ref 0–50)
ANION GAP SERPL CALCULATED.3IONS-SCNC: 12 MMOL/L (ref 7–15)
AST SERPL W P-5'-P-CCNC: 27 U/L (ref 0–45)
BILIRUB SERPL-MCNC: 0.7 MG/DL
BUN SERPL-MCNC: 16 MG/DL (ref 8–23)
CALCIUM SERPL-MCNC: 9.5 MG/DL (ref 8.8–10.4)
CHLORIDE SERPL-SCNC: 101 MMOL/L (ref 98–107)
CHOLEST SERPL-MCNC: 178 MG/DL
CREAT SERPL-MCNC: 0.73 MG/DL (ref 0.51–0.95)
EGFRCR SERPLBLD CKD-EPI 2021: 88 ML/MIN/1.73M2
FASTING STATUS PATIENT QL REPORTED: YES
FASTING STATUS PATIENT QL REPORTED: YES
GLUCOSE SERPL-MCNC: 111 MG/DL (ref 70–99)
HCO3 SERPL-SCNC: 28 MMOL/L (ref 22–29)
HDLC SERPL-MCNC: 46 MG/DL
LDLC SERPL CALC-MCNC: 93 MG/DL
NONHDLC SERPL-MCNC: 132 MG/DL
POTASSIUM SERPL-SCNC: 3.4 MMOL/L (ref 3.4–5.3)
PROT SERPL-MCNC: 7.2 G/DL (ref 6.4–8.3)
SODIUM SERPL-SCNC: 141 MMOL/L (ref 135–145)
TRIGL SERPL-MCNC: 193 MG/DL

## 2025-02-03 PROCEDURE — 90662 IIV NO PRSV INCREASED AG IM: CPT | Performed by: INTERNAL MEDICINE

## 2025-02-03 PROCEDURE — 36415 COLL VENOUS BLD VENIPUNCTURE: CPT | Performed by: INTERNAL MEDICINE

## 2025-02-03 PROCEDURE — 80053 COMPREHEN METABOLIC PANEL: CPT | Performed by: INTERNAL MEDICINE

## 2025-02-03 PROCEDURE — 90480 ADMN SARSCOV2 VAC 1/ONLY CMP: CPT | Performed by: INTERNAL MEDICINE

## 2025-02-03 PROCEDURE — 80061 LIPID PANEL: CPT | Performed by: INTERNAL MEDICINE

## 2025-02-03 PROCEDURE — G0439 PPPS, SUBSEQ VISIT: HCPCS | Performed by: INTERNAL MEDICINE

## 2025-02-03 PROCEDURE — 91320 SARSCV2 VAC 30MCG TRS-SUC IM: CPT | Performed by: INTERNAL MEDICINE

## 2025-02-03 PROCEDURE — G0008 ADMIN INFLUENZA VIRUS VAC: HCPCS | Performed by: INTERNAL MEDICINE

## 2025-02-03 RX ORDER — HYDROCHLOROTHIAZIDE 25 MG/1
25 TABLET ORAL DAILY
Qty: 100 TABLET | Refills: 1 | Status: SHIPPED | OUTPATIENT
Start: 2025-02-03

## 2025-02-03 RX ORDER — ATORVASTATIN CALCIUM 20 MG/1
20 TABLET, FILM COATED ORAL DAILY
Qty: 100 TABLET | Refills: 1 | Status: SHIPPED | OUTPATIENT
Start: 2025-02-03

## 2025-02-03 SDOH — HEALTH STABILITY: PHYSICAL HEALTH: ON AVERAGE, HOW MANY DAYS PER WEEK DO YOU ENGAGE IN MODERATE TO STRENUOUS EXERCISE (LIKE A BRISK WALK)?: 1 DAY

## 2025-02-03 ASSESSMENT — SOCIAL DETERMINANTS OF HEALTH (SDOH): HOW OFTEN DO YOU GET TOGETHER WITH FRIENDS OR RELATIVES?: TWICE A WEEK

## 2025-02-03 ASSESSMENT — PAIN SCALES - GENERAL: PAINLEVEL_OUTOF10: NO PAIN (0)

## 2025-02-03 NOTE — LETTER
February 5, 2025      Masha Chery  [address] [place] [id number]        Dear Masha:    We are writing to inform you of your test results.    Labs are stable.    Resulted Orders   Lipid panel reflex to direct LDL Non-fasting   Result Value Ref Range    Cholesterol 178 <200 mg/dL    Triglycerides 193 (H) <150 mg/dL    Direct Measure HDL 46 (L) >=50 mg/dL    LDL Cholesterol Calculated 93 <100 mg/dL    Non HDL Cholesterol 132 (H) <130 mg/dL    Patient Fasting > 8hrs? Yes     Narrative    Cholesterol  Desirable: < 200 mg/dL  Borderline High: 200 - 239 mg/dL  High: >= 240 mg/dL    Triglycerides  Normal: < 150 mg/dL  Borderline High: 150 - 199 mg/dL  High: 200-499 mg/dL  Very High: >= 500 mg/dL    Direct Measure HDL  Female: >= 50 mg/dL   Male: >= 40 mg/dL    LDL Cholesterol  Desirable: < 100 mg/dL  Above Desirable: 100 - 129 mg/dL   Borderline High: 130 - 159 mg/dL   High:  160 - 189 mg/dL   Very High: >= 190 mg/dL    Non HDL Cholesterol  Desirable: < 130 mg/dL  Above Desirable: 130 - 159 mg/dL  Borderline High: 160 - 189 mg/dL  High: 190 - 219 mg/dL  Very High: >= 220 mg/dL   Comprehensive metabolic panel (BMP + Alb, Alk Phos, ALT, AST, Total. Bili, TP)   Result Value Ref Range    Sodium 141 135 - 145 mmol/L    Potassium 3.4 3.4 - 5.3 mmol/L    Carbon Dioxide (CO2) 28 22 - 29 mmol/L    Anion Gap 12 7 - 15 mmol/L    Urea Nitrogen 16.0 8.0 - 23.0 mg/dL    Creatinine 0.73 0.51 - 0.95 mg/dL    GFR Estimate 88 >60 mL/min/1.73m2      Comment:      eGFR calculated using 2021 CKD-EPI equation.    Calcium 9.5 8.8 - 10.4 mg/dL    Chloride 101 98 - 107 mmol/L    Glucose 111 (H) 70 - 99 mg/dL    Alkaline Phosphatase 88 40 - 150 U/L    AST 27 0 - 45 U/L    ALT 24 0 - 50 U/L    Protein Total 7.2 6.4 - 8.3 g/dL    Albumin 4.4 3.5 - 5.2 g/dL    Bilirubin Total 0.7 <=1.2 mg/dL    Patient Fasting > 8hrs? Yes      If you have any questions or concerns, please call the clinic at the number listed above.      Sincerely,      Jim Santoyo MD/eduardo    Electronically signed

## 2025-02-03 NOTE — PATIENT INSTRUCTIONS
[email or web address]  [facility] [place]  [address], [place] [address]   [address]  [phone]        Patient Education   Preventive Care Advice   This is general advice given by our system to help you stay healthy. However, your care team may have specific advice just for you. Please talk to your care team about your preventive care needs.  Nutrition  Eat 5 or more servings of fruits and vegetables each day.  Try wheat bread, brown rice and whole grain pasta (instead of white bread, rice, and pasta).  Get enough calcium and vitamin D. Check the label on foods and aim for 100% of the RDA (recommended daily allowance).  Lifestyle  Exercise at least 150 minutes each week  (30 minutes a day, 5 days a week).  Do muscle strengthening activities 2 days a week. These help control your weight and prevent disease.  No smoking.  Wear sunscreen to prevent skin cancer.  Have a dental exam and cleaning every 6 months.  Yearly exams  See your health care team every year to talk about:  Any changes in your health.  Any medicines your care team has prescribed.  Preventive care, family planning, and ways to prevent chronic diseases.  Shots (vaccines)   HPV shots (up to age 26), if you've never had them before.  Hepatitis B shots (up to age 59), if you've never had them before.  COVID-19 shot: Get this shot when it's due.  Flu shot: Get a flu shot every year.  Tetanus shot: Get a tetanus shot every 10 years.  Pneumococcal, hepatitis A, and RSV shots: Ask your care team if you need these based on your risk.  Shingles shot (for age 50 and up)  General health tests  Diabetes screening:  Starting at age 35, Get screened for diabetes at least every 3 years.  If you are younger than age 35, ask your care team if you should be screened for diabetes.  Cholesterol test: At age 39, start having a cholesterol test every 5 years, or more often if advised.  Bone density scan  (DEXA): At age 50, ask your care team if you should have this scan for osteoporosis (brittle bones).  Hepatitis C: Get tested at least once in your life.  STIs (sexually transmitted infections)  Before age 24: Ask your care team if you should be screened for STIs.  After age 24: Get screened for STIs if you're at risk. You are at risk for STIs (including HIV) if:  You are sexually active with more than one person.  You don't use condoms every time.  You or a partner was diagnosed with a sexually transmitted infection.  If you are at risk for HIV, ask about PrEP medicine to prevent HIV.  Get tested for HIV at least once in your life, whether you are at risk for HIV or not.  Cancer screening tests  Cervical cancer screening: If you have a cervix, begin getting regular cervical cancer screening tests starting at age 21.  Breast cancer scan (mammogram): If you've ever had breasts, begin having regular mammograms starting at age 40. This is a scan to check for breast cancer.  Colon cancer screening: It is important to start screening for colon cancer at age 45.  Have a colonoscopy test every 10 years (or more often if you're at risk) Or, ask your provider about stool tests like a FIT test every year or Cologuard test every 3 years.  To learn more about your testing options, visit:   .  For help making a decision, visit:   https://bit.ly/ym21593.  Prostate cancer screening test: If you have a prostate, ask your care team if a prostate cancer screening test (PSA) at age 55 is right for you.  Lung cancer screening: If you are a current or former smoker ages 50 to 80, ask your care team if ongoing lung cancer screenings are right for you.  For informational purposes only. Not to replace the advice of your health care provider. Copyright   2023 [employer]. All rights reserved. Clinically reviewed by the [facility] Transitions Program. [employer] 826294 - REV 01/24.

## 2025-02-03 NOTE — PROGRESS NOTES
"Preventive Care Visit  [facility] JOB Santoyo MD, Internal Medicine - Pediatrics  Feb 3, 2025      Assessment & Plan   Problem List Items Addressed This Visit       Essential hypertension    Relevant Orders    Comprehensive metabolic panel (BMP + Alb, Alk Phos, ALT, AST, Total. Bili, TP) (Completed)    Hyperlipidemia    Relevant Orders    Lipid panel reflex to direct LDL Non-fasting (Completed)     Other Visit Diagnoses       Screen for colon cancer    -  Primary    Relevant Orders    Colonoscopy Screening  Referral    Encounter for Medicare annual wellness exam        Aortic valve sclerosis        Relevant Orders    Echocardiogram Complete                    BMI  Estimated body mass index is 34.72 kg/m  as calculated from the following:    Height as of this encounter: 1.6 m (5' 3\").    Weight as of this encounter: 88.9 kg (196 lb).   Weight management plan: Discussed healthy diet and exercise guidelines    Counseling  Appropriate preventive services were addressed with this patient via screening, questionnaire, or discussion as appropriate for fall prevention, nutrition, physical activity, Tobacco-use cessation, social engagement, weight loss and cognition.  Checklist reviewing preventive services available has been given to the patient.  Reviewed patient's diet, addressing concerns and/or questions.   She is at risk for lack of exercise and has been provided with information to increase physical activity for the benefit of her well-being.     Work on weight loss  Regular exercise      Bre Flanagan is a 69 year old, presenting for the following:  Physical        2/3/2025     9:56 AM   Additional Questions   Roomed by Jaclyn   Accompanied by          2/3/2025   Forms   Any forms needing to be completed Yes           HPI          Health Care Directive  Patient does not have a Health Care Directive: Discussed advance care planning with patient; information given to " patient to review.      2/3/2025   General Health   How would you rate your overall physical health? Good   Feel stress (tense, anxious, or unable to sleep) Not at all         2/3/2025   Nutrition   Diet: Regular (no restrictions)         2/3/2025   Exercise   Days per week of moderate/strenous exercise 1 day   (!) EXERCISE CONCERN      2/3/2025   Social Factors   Frequency of gathering with friends or relatives Twice a week   Worry food won't last until get money to buy more No   Food not last or not have enough money for food? No   Do you have housing? (Housing is defined as stable permanent housing and does not include staying ouside in a car, in a tent, in an abandoned building, in an overnight shelter, or couch-surfing.) Yes   Are you worried about losing your housing? No   Lack of transportation? No   Unable to get utilities (heat,electricity)? No         2/3/2025   Fall Risk   Fallen 2 or more times in the past year? No   Trouble with walking or balance? No          2/3/2025   Activities of Daily Living- Home Safety   Needs help with the following daily activites None of the above   Safety concerns in the home None of the above         2/3/2025   Dental   Dentist two times every year? Yes         2/3/2025   Hearing Screening   Hearing concerns? None of the above         2/3/2025   Driving Risk Screening   Patient/family members have concerns about driving No         2/3/2025   General Alertness/Fatigue Screening   Have you been more tired than usual lately? No         2/3/2025   Urinary Incontinence Screening   Bothered by leaking urine in past 6 months No         2/3/2025   TB Screening   Were you born outside of the [place]? No         Today's PHQ-2 Score:       2/3/2025     9:34 AM   PHQ-2 ( 1999 Pf[employer])   Q1: Little interest or pleasure in doing things 0   Q2: Feeling down, depressed or hopeless 0   PHQ-2 Score 0    Q1: Little interest or pleasure in doing things Not at all   Q2: Feeling down, depressed or  hopeless Not at all   PHQ-2 Score 0       Patient-reported           2/3/2025   Substance Use   Alcohol more than 3/day or more than 7/wk Not Applicable   Do you have a current opioid prescription? No   How severe/bad is pain from 1 to 10? 6/10   Do you use any other substances recreationally? No     Social History     Tobacco Use    Smoking status: Former     Current packs/day: 0.00     Types: Cigarettes     Start date: 1973     Quit date: 1983     Years since quittin.9     Passive exposure: Never    Smokeless tobacco: Never   Vaping Use    Vaping status: Never Used   Substance Use Topics    Alcohol use: No    Drug use: No           4/15/2024   LAST FHS-7 RESULTS   1st degree relative breast or ovarian cancer No   Any relative bilateral breast cancer No   Any male have breast cancer No   Any ONE woman have BOTH breast AND ovarian cancer No   Any woman with breast cancer before 50yrs No   2 or more relatives with breast AND/OR ovarian cancer No   2 or more relatives with breast AND/OR bowel cancer No        Mammogram Screening - Mammogram every 1-2 years updated in Health Maintenance based on mutual decision making      History of abnormal Pap smear: No - age 65 or older with adequate negative prior screening test results (3 consecutive negative cytology results, 2 consecutive negative cotesting results, or 2 consecutive negative HrHPV test results within 10 years, with the most recent test occurring within the recommended screening interval for the test used)       ASCVD Risk   The 10-year ASCVD risk score (Mami WHITNEY, et al., 2019) is: 11.5%    Values used to calculate the score:      Age: 69 years      Sex: Female      Is Non- : No      Diabetic: No      Tobacco smoker: No      Systolic Blood Pressure: 125 mmHg      Is BP treated: Yes      HDL Cholesterol: 59 mg/dL      Total Cholesterol: 177 mg/dL            Reviewed and updated as needed this visit by Provider                     Lab work is in process  Labs reviewed in EPIC  BP Readings from Last 3 Encounters:   25 125/78   24 133/82   24 125/84    Wt Readings from Last 3 Encounters:   25 88.9 kg (196 lb)   24 86.2 kg (190 lb)   24 86.5 kg (190 lb 12.8 oz)                  Patient Active Problem List   Diagnosis    Eczematous dermatitis    Essential hypertension    Hypercalciuria, idiopathic    Hyperlipidemia    Osteoporosis    Impaired fasting glucose    Gastroesophageal reflux disease with esophagitis, unspecified whether hemorrhage    BPPV (benign paroxysmal positional vertigo)     Past Surgical History:   Procedure Laterality Date    CHOLECYSTECTOMY      COMBINED ESOPHAGOSCOPY, GASTROSCOPY, DUODENOSCOPY (EGD) WITH CO2 INSUFFLATION N/A 2022    Procedure: ESOPHAGOGASTRODUODENOSCOPY, WITH CO2 INSUFFLATION;  Surgeon: Zion Calles MD;  Location: [facility] OR    ESOPHAGOSCOPY, GASTROSCOPY, DUODENOSCOPY (EGD), COMBINED N/A 2022    Procedure: ESOPHAGOGASTRODUODENOSCOPY, WITH BIOPSY;  Surgeon: Zion Calles MD;  Location: [facility] OR    FEMUR SURGERY Right     Following fracture, macho and screws in place    HC REMOVAL GALLBLADDER      Description: Cholecystectomy;  Recorded: 2012;    HYSTERECTOMY, PAP NO LONGER INDICATED         Social History     Tobacco Use    Smoking status: Former     Current packs/day: 0.00     Types: Cigarettes     Start date: 1973     Quit date: 1983     Years since quittin.9     Passive exposure: Never    Smokeless tobacco: Never   Substance Use Topics    Alcohol use: No     Family History   Problem Relation Age of Onset    Diabetes Mother     Cerebrovascular Disease Father     Coronary Artery Disease Brother 51    Cerebrovascular Disease Sister          Current Outpatient Medications   Medication Sig Dispense Refill    aspirin (ASA) 325 MG EC tablet Take 1 tablet (325 mg) by mouth daily 90 tablet 3    atorvastatin (LIPITOR)  20 MG tablet TAKE 1 TABLET BY MOUTH DAILY 100 tablet 1    B Complex Vitamins (VITAMIN B COMPLEX PO) Take 1 tablet by mouth daily      Bioflavonoid Products (VITAMIN C PLUS) 1000 MG TABS Take 1 tablet by mouth daily      calcium carbonate-vitamin D (OS-GILDA) 500-400 MG-UNIT tablet Take 1 tablet by mouth daily Take 1 in AM and 1.5 in PM      cyclobenzaprine (FLEXERIL) 5 MG tablet Take 1 tablet (5 mg) by mouth 2 times daily as needed for muscle spasms 30 tablet 0    EQ ALLERGY RELIEF, CETIRIZINE, 10 MG tablet Take 1 tablet by mouth once daily 90 tablet 0    hydrochlorothiazide (HYDRODIURIL) 25 MG tablet TAKE 1 TABLET BY MOUTH DAILY 100 tablet 1    MAGNESIUM PO Take 250 mg by mouth daily      meclizine (ANTIVERT) 25 MG tablet Take 1 tablet (25 mg) by mouth 3 times daily as needed for dizziness 25 tablet 4    Multiple Vitamins-Minerals (MULTIVITAMIN WOMEN) TABS Take 1 tablet by mouth daily      olopatadine (PATADAY) 0.2 % ophthalmic solution Place 0.05 mLs (1 drop) into the right eye daily 2.5 mL 1    Omega-3 Fatty Acids (FISH OIL) 600 MG CAPS Take 1 capsule by mouth daily      pantoprazole (PROTONIX) 40 MG EC tablet TAKE 1 TABLET BY MOUTH DAILY 100 tablet 0    triamcinolone (KENALOG) 0.1 % external cream Apply to affected area twice per day as needed. 45 g 1     Allergies   Allergen Reactions    Citric Acid Hives     Foods that contain citric acid.     Renacidin Rash     SHE'S ALLERGIC TO CITRIC ACIDS AND SOUR FOODS     Current providers sharing in care for this patient include:  Patient Care Team:  Jim Santoyo MD as PCP - General (Internal Medicine - Pediatrics)  Mikki Muro MD as Assigned Musculoskeletal Provider  Jim Santoyo MD as Assigned PCP    The following health maintenance items are reviewed in Epic and correct as of today:  Health Maintenance   Topic Date Due    INFLUENZA VACCINE (1) 09/01/2024    COVID-19 Vaccine (7 - 2024-25 season) 09/01/2024    COLORECTAL CANCER SCREENING  01/19/2025     "MEDICARE ANNUAL WELLNESS VISIT  02/22/2025    BMP  02/22/2025    LIPID  02/22/2025    ANNUAL REVIEW OF HM ORDERS  02/22/2025    FALL RISK ASSESSMENT  02/03/2026    MAMMO SCREENING  04/15/2026    GLUCOSE  02/22/2027    ADVANCE CARE PLANNING  02/22/2029    DTAP/TDAP/TD IMMUNIZATION (4 - Td or Tdap) 09/16/2029    RSV VACCINE (1 - 1-dose 75+ series) 12/24/2029    DEXA  03/06/2039    HEPATITIS C SCREENING  Completed    PHQ-2 (once per calendar year)  Completed    Pneumococcal Vaccine: 50+ Years  Completed    ZOSTER IMMUNIZATION  Completed    HPV IMMUNIZATION  Aged Out    MENINGITIS IMMUNIZATION  Aged Out    RSV MONOCLONAL ANTIBODY  Aged Out         Review of Systems  Constitutional, HEENT, cardiovascular, pulmonary, gi and gu systems are negative, except as otherwise noted.     Objective    Exam  /78 (BP Location: Left arm, Patient Position: Sitting, Cuff Size: Adult Large)   Pulse 58   Temp 98.1  F (36.7  C) (Temporal)   Resp 18   Ht 1.6 m (5' 3\")   Wt 88.9 kg (196 lb)   SpO2 96%   BMI 34.72 kg/m     Estimated body mass index is 34.72 kg/m  as calculated from the following:    Height as of this encounter: 1.6 m (5' 3\").    Weight as of this encounter: 88.9 kg (196 lb).    Physical Exam  GENERAL: alert and no distress  EYES: Eyes grossly normal to inspection, PERRL and conjunctivae and sclerae normal  HENT: ear canals and TM's normal, nose and mouth without ulcers or lesions  NECK: no adenopathy, no asymmetry, masses, or scars  RESP: lungs clear to auscultation - no rales, rhonchi or wheezes  CV: regular rate and rhythm, normal S1 S2, no S3 or S4, no murmur, click or rub, no peripheral edema  ABDOMEN: soft, nontender, no hepatosplenomegaly, no masses and bowel sounds normal  MS: no gross musculoskeletal defects noted, no edema  SKIN: no suspicious lesions or rashes  Callous vs wart second toe   Frozen times 3 and bandaged    NEURO: Normal strength and tone, mentation intact and speech normal  BACK: no CVA " tenderness, no paralumbar tenderness  PSYCH: mentation appears normal, affect normal/bright  LYMPH: no cervical, supraclavicular, axillary, or inguinal adenopathy        2/3/2025   Mini Cog   Clock Draw Score 2 Normal   3 Item Recall 2 objects recalled   Mini Cog Total Score 4              Signed Electronically by: Jim Santoyo MD

## 2025-02-04 RX ORDER — PANTOPRAZOLE SODIUM 40 MG/1
40 TABLET, DELAYED RELEASE ORAL DAILY
Qty: 100 TABLET | Refills: 2 | Status: SHIPPED | OUTPATIENT
Start: 2025-02-04

## 2025-02-19 ENCOUNTER — TELEPHONE (OUTPATIENT)
Dept: FAMILY MEDICINE | Facility: CLINIC | Age: 70
End: 2025-02-19
Payer: COMMERCIAL

## 2025-02-19 DIAGNOSIS — L30.9 ECZEMA, UNSPECIFIED TYPE: Primary | ICD-10-CM

## 2025-02-19 NOTE — TELEPHONE ENCOUNTER
Patient stated at last physical that PCP was going to refill all meds.    Request sent from pharmacy on 2-3-2025.   Triamcinolone Acetonide 0.1 % Apply to affected area twice per day as needed.     Listed as an outside medication. Wanting medication filled through [facility].    Hilda Cornelius RN on 2/19/2025 at 2:56 PM

## 2025-02-20 RX ORDER — TRIAMCINOLONE ACETONIDE 1 MG/G
CREAM TOPICAL 2 TIMES DAILY
Qty: 80 G | Refills: 4 | Status: SHIPPED | OUTPATIENT
Start: 2025-02-20

## 2025-03-19 ENCOUNTER — TRANSFERRED RECORDS (OUTPATIENT)
Dept: LAB | Facility: CLINIC | Age: 70
End: 2025-03-19
Payer: COMMERCIAL

## 2025-03-24 ENCOUNTER — PATIENT OUTREACH (OUTPATIENT)
Dept: GASTROENTEROLOGY | Facility: CLINIC | Age: 70
End: 2025-03-24
Payer: COMMERCIAL

## 2025-03-24 PROBLEM — D12.6 ADENOMATOUS POLYP OF COLON: Status: ACTIVE | Noted: 2025-03-24

## 2025-03-24 NOTE — PROCEDURES
[facility]   [address], [address], [place] [address]     Patient Name: Masha Chery  Gender:  Female  Exam Date: 03/19/2025 Visit Number:  [id number]  Age: 69 Years YOB: 1955  Attending MD: Parish Fernandez MD Medical Record#:  [id number]    Procedure: Colonoscopy   Indications: Colorectal cancer screening      Referring MD: Jim Santoyo MD  Primary MD:      Jim Santoyo MD  Medications: Admitting Medications:   0.9% Normal Saline at [facility]   Ondansetron Hydrochloride (Zofran) given  4mg  by IV   Intra Procedure Medications:   Patient received monitored anesthesia care.     Complications: No immediate complications  ______________________________________________________________________________  Procedure:   An examination of the heart and lungs was performed and found to be within acceptable limits.  .  The patient was therefore deemed a reasonable candidate for endoscopy and sedation.   The risks and benefits of the procedure were explained to the patient.After obtaining informed consent, the patient received monitored anesthesia care and I passed the scope   without difficulty via the rectum to the cecum.  The appendiceal orifice and ic valve were identified.  The scope was retroflexed during the examination  The quality of the prep was excellent  (Miralax/Gatorade/2 tablets Bisacodyl/Magnesium Citrate).    This was a complete examination throughout the entire colon.    Findings:  Polyp location: cecum.  Quantity: 1.  Size: 2 mm.  Polyp shape:  sessile.         Maneuver: polypectomy was performed with a cold snare.       Removal:  complete.  Retrieval: complete.  Bleeding: none.  Polyp location: descending colon.  Quantity: 1.  Size: 4 mm.  Polyp shape: sessile.         Maneuver: polypectomy was performed with a  cold snare.       Removal: complete.  Retrieval: complete.  Bleeding: none.  Diverticulosis.  Location: - sigmoid.      Size:  small.    No  inflammation present.  Remainder of the exam is normal.    Impression:  Polyp of colon, unspecified part of colon, unspecified type    Preliminary Plan:  The patient and their physician will receive a copy of the pathology report as well as pathology-based recommendations for future screening or surveillance.  Return to your primary care provider as needed.  The [place] Preventive Services Task Force recommends against routine screening for colorectal cancer in adults age 76 to 85 years.  There may be considerations that support colorectal cancer screening in an individual patient.  Pathology Results:  A: COLON, CECUM, POLYP:           1. Tubular adenoma           2. Negative for high grade dysplasia           3. Per the colonoscopy report:               a. Polyp size: 2 mm               b. Resection: Complete               c. Retrieval: Complete      B: COLON, DESCENDING, POLYP:           1. Tubular adenoma           2. Negative for high grade dysplasia           3. Per the colonoscopy report:               a. Polyp size: 4 mm               b. Resection: Complete               c. Retrieval: Complete      MICROSCOPIC  A: Performed   B: Performed     Electronically signed by: Burt Stroud MD    Interpreted at [facility], [address] [place] [id number]    Orders    Instruction(s)/Education:  Instruction/Education Timeframe Assessment   Colon Cancer Prevention  K63.5   Colon Polyps  K63.5   Diverticulosis/Diverticulitis  K63.5   High Fiber Diet  K63.5       Final Plan:  Repeat colonoscopy in 5 years.    We will attempt to contact you at appropriate intervals via [place] mail.  We may not be able to find you or contact you at that time, therefore you should know that the responsibility for following our recommendation rests with you.  If you don't hear from us at the time your procedure is due, please contact our office to schedule an appointment.  If your contact  information should change, please contact our office so that we can update your record.  The [place] Preventive Services Task Force recommends against routine screening for colorectal cancer in adults age 76 to 85 years.  There may be considerations that support colorectal cancer screening in an individual patient.      _Electronically signed by:___________________  Andriy Paniagua MD                 03/19/2025    cc: Jim Santoyo MD  cc: Jim Santoyo MD

## 2025-08-12 DIAGNOSIS — I10 ESSENTIAL HYPERTENSION: ICD-10-CM

## 2025-08-12 DIAGNOSIS — E78.2 MIXED HYPERLIPIDEMIA: ICD-10-CM

## 2025-08-13 RX ORDER — ATORVASTATIN CALCIUM 20 MG/1
20 TABLET, FILM COATED ORAL DAILY
Qty: 100 TABLET | Refills: 0 | Status: SHIPPED | OUTPATIENT
Start: 2025-08-13

## 2025-08-13 RX ORDER — HYDROCHLOROTHIAZIDE 25 MG/1
25 TABLET ORAL DAILY
Qty: 100 TABLET | Refills: 0 | Status: SHIPPED | OUTPATIENT
Start: 2025-08-13

## (undated) DEVICE — PREP CHLORAPREP 26ML TINTED ORANGE  260815

## (undated) DEVICE — SOL WATER IRRIG 1000ML BOTTLE 07139-09

## (undated) RX ORDER — FENTANYL CITRATE 50 UG/ML
INJECTION, SOLUTION INTRAMUSCULAR; INTRAVENOUS
Status: DISPENSED
Start: 2022-09-06